# Patient Record
Sex: MALE | Race: WHITE | NOT HISPANIC OR LATINO | Employment: UNEMPLOYED | URBAN - METROPOLITAN AREA
[De-identification: names, ages, dates, MRNs, and addresses within clinical notes are randomized per-mention and may not be internally consistent; named-entity substitution may affect disease eponyms.]

---

## 2021-02-10 ENCOUNTER — TELEPHONE (OUTPATIENT)
Dept: GASTROENTEROLOGY | Facility: CLINIC | Age: 41
End: 2021-02-10

## 2021-02-10 ENCOUNTER — OFFICE VISIT (OUTPATIENT)
Dept: GASTROENTEROLOGY | Facility: CLINIC | Age: 41
End: 2021-02-10

## 2021-02-10 VITALS — WEIGHT: 287.8 LBS | BODY MASS INDEX: 35.05 KG/M2 | TEMPERATURE: 97 F | HEIGHT: 76 IN

## 2021-02-10 DIAGNOSIS — R10.9 ABDOMINAL PAIN, UNSPECIFIED ABDOMINAL LOCATION: Primary | ICD-10-CM

## 2021-02-10 PROCEDURE — 99204 OFFICE O/P NEW MOD 45 MIN: CPT | Performed by: INTERNAL MEDICINE

## 2021-02-10 RX ORDER — IBUPROFEN 600 MG/1
600 TABLET ORAL
COMMUNITY
Start: 2021-01-26 | End: 2021-02-25

## 2021-02-10 NOTE — PROGRESS NOTES
Chief Complaint   Patient presents with   • Abdominal Pain   • Dizziness   • Cold Feet     Subjective   HPI  Norberto Jarrell is a 41 y.o. male who presents today for new patient evaluation.    Patient has been having issues with flank pain over the last 2 to 3 weeks.  Predominantly in the right flank but sometimes occurring bilaterally.  Because of this he underwent a CT scan of the abdomen and pelvis without contrast to rule out kidney stones.  There was incidental note of a slightly enlarged lymph node in the area of the gastrohepatic ligament measuring approximately 1.4 cm.  There is no obvious gastric abnormality although stomach was noted to be nondistended.  Patient was referred to GI for possible endoscopic evaluation.  He has no raheel abdominal pain.  Denies any heartburn or dyspeptic symptoms.  No change in bowel habit.    IMPRESSION:  1. No nephroureterolithiasis or hydronephrosis.  2. Bladder wall thickening may be exaggerated by under distention. Outlet obstruction or cystitis is not excluded.  3. Gastrohepatic ligament lymphadenopathy. Stomach is not well-distended. Endoscopy could be performed to further evaluate.    Objective   Vitals:    02/10/21 0938   Temp: 97 °F (36.1 °C)     Physical Exam  Vitals signs reviewed.   Constitutional:       Appearance: He is well-developed.   HENT:      Head: Normocephalic and atraumatic.   Skin:     General: Skin is warm and dry.   Neurological:      Mental Status: He is alert and oriented to person, place, and time.   Psychiatric:         Behavior: Behavior normal.         Thought Content: Thought content normal.         Judgment: Judgment normal.       The following data was reviewed by: Diego Weston MD on 02/10/2021:  Common labs    Common Labsle 4/30/20 4/30/20 4/30/20 4/30/20 1/25/21 1/25/21 2/9/21 2/9/21    1155 1155 1155 1155 1440 1440 1358 1358   Glucose   102 (A)  91  88    BUN   14  19  11    Creatinine   0.9  0.8  0.7    Sodium   138  136 (A)  139     Potassium   4.5  4.5  4.1    Chloride   101  102  102    Calcium   9.3  9.4  8.8    Albumin   4.2  4.4  4.2    Total Bilirubin   0.6  0.4  0.4    Alkaline Phosphatase   63  77  64    AST (SGOT)   33  34  29    ALT (SGPT)   36  43  30    WBC    7.28  7.45  6.89   Hemoglobin    15.2  14.6  14.4   Hematocrit    45.7  44.2  44.4   Platelets    307  308  309   Total Cholesterol  161         Triglycerides  68         HDL Cholesterol  48         LDL Cholesterol   99         PSA 1.72          (A) Abnormal value            Data reviewed: Radiologic studies CT scan from [facility]   Assessment/Plan   Assessment:     1.  Flank pain  2.  Gastrohepatic lymphadenopathy    Plan:   Slightly enlarged GH LN entirely non-specific, and very unlikely to be related to his primary complaint of flank pain.  Will arrange for EGD to r/o presence of any gastric inflammation/PUD, and then may consider f/u CT scan with IV and oral contrast.  He should also f/u with PCP to continue evaluate for his flank pain which does not appear to be GI related.             Diego Weston M.D.  [facility]  [address] [place] [address]  Office: [phone]

## 2021-02-12 ENCOUNTER — TELEPHONE (OUTPATIENT)
Dept: GASTROENTEROLOGY | Facility: CLINIC | Age: 41
End: 2021-02-12

## 2021-02-12 NOTE — TELEPHONE ENCOUNTER
Called pt we have time available on march 3 in the afternoon if he would like to move up lm on  pt to call

## 2021-02-12 NOTE — TELEPHONE ENCOUNTER
----- Message from Nesha Dupree sent at 2/12/2021 11:12 AM EST -----  Contact: [phone]  Patient is schedule for the week of 3/22 for a scope.  He is having increasing pain.  Can we get him in sooner for his scopes?

## 2021-02-12 NOTE — TELEPHONE ENCOUNTER
----- Message from Soy Luque sent at 2/12/2021  1:19 PM EST -----  Regarding: pt returning call  Contact: [phone]  Pt states 3/5/21 is okay to move procedure to. Please call pt to confirm. Thank you

## 2021-02-23 ENCOUNTER — TRANSCRIBE ORDERS (OUTPATIENT)
Dept: SLEEP MEDICINE | Facility: HOSPITAL | Age: 41
End: 2021-02-23

## 2021-02-23 DIAGNOSIS — Z01.818 OTHER SPECIFIED PRE-OPERATIVE EXAMINATION: Primary | ICD-10-CM

## 2021-03-01 ENCOUNTER — LAB (OUTPATIENT)
Dept: LAB | Facility: HOSPITAL | Age: 41
End: 2021-03-01

## 2021-03-01 DIAGNOSIS — Z01.818 OTHER SPECIFIED PRE-OPERATIVE EXAMINATION: ICD-10-CM

## 2021-03-01 PROCEDURE — C9803 HOPD COVID-19 SPEC COLLECT: HCPCS

## 2021-03-01 PROCEDURE — U0004 COV-19 TEST NON-CDC HGH THRU: HCPCS

## 2021-03-02 LAB — SARS-COV-2 RNA RESP QL NAA+PROBE: NOT DETECTED

## 2021-03-03 ENCOUNTER — HOSPITAL ENCOUNTER (OUTPATIENT)
Facility: HOSPITAL | Age: 41
Setting detail: HOSPITAL OUTPATIENT SURGERY
Discharge: HOME | End: 2021-03-03
Attending: INTERNAL MEDICINE | Admitting: INTERNAL MEDICINE

## 2021-03-03 ENCOUNTER — ANESTHESIA (OUTPATIENT)
Dept: GASTROENTEROLOGY | Facility: HOSPITAL | Age: 41
End: 2021-03-03

## 2021-03-03 ENCOUNTER — ANESTHESIA EVENT (OUTPATIENT)
Dept: GASTROENTEROLOGY | Facility: HOSPITAL | Age: 41
End: 2021-03-03

## 2021-03-03 VITALS
HEIGHT: 76 IN | HEART RATE: 84 BPM | OXYGEN SATURATION: 96 % | RESPIRATION RATE: 16 BRPM | WEIGHT: 287 LBS | TEMPERATURE: 98.8 F | BODY MASS INDEX: 34.95 KG/M2 | SYSTOLIC BLOOD PRESSURE: 106 MMHG | DIASTOLIC BLOOD PRESSURE: 77 MMHG

## 2021-03-03 DIAGNOSIS — R10.9 ABDOMINAL PAIN, UNSPECIFIED ABDOMINAL LOCATION: ICD-10-CM

## 2021-03-03 PROCEDURE — 25010000002 PROPOFOL 10 MG/ML EMULSION: Performed by: NURSE ANESTHETIST, CERTIFIED REGISTERED

## 2021-03-03 PROCEDURE — 43239 EGD BIOPSY SINGLE/MULTIPLE: CPT | Performed by: INTERNAL MEDICINE

## 2021-03-03 PROCEDURE — 88305 TISSUE EXAM BY PATHOLOGIST: CPT | Performed by: INTERNAL MEDICINE

## 2021-03-03 PROCEDURE — 88342 IMHCHEM/IMCYTCHM 1ST ANTB: CPT | Performed by: INTERNAL MEDICINE

## 2021-03-03 RX ORDER — EPHEDRINE SULFATE 50 MG/ML
5 INJECTION, SOLUTION INTRAVENOUS ONCE AS NEEDED
Status: DISCONTINUED | OUTPATIENT
Start: 2021-03-03 | End: 2021-03-03 | Stop reason: HOSPADM

## 2021-03-03 RX ORDER — PANTOPRAZOLE SODIUM 40 MG/1
40 TABLET, DELAYED RELEASE ORAL DAILY
Qty: 30 TABLET | Refills: 5 | Status: SHIPPED | OUTPATIENT
Start: 2021-03-03 | End: 2021-07-01

## 2021-03-03 RX ORDER — LABETALOL HYDROCHLORIDE 5 MG/ML
5 INJECTION, SOLUTION INTRAVENOUS
Status: DISCONTINUED | OUTPATIENT
Start: 2021-03-03 | End: 2021-03-03 | Stop reason: HOSPADM

## 2021-03-03 RX ORDER — PROPOFOL 10 MG/ML
VIAL (ML) INTRAVENOUS AS NEEDED
Status: DISCONTINUED | OUTPATIENT
Start: 2021-03-03 | End: 2021-03-03 | Stop reason: SURG

## 2021-03-03 RX ORDER — FENTANYL CITRATE 50 UG/ML
25 INJECTION, SOLUTION INTRAMUSCULAR; INTRAVENOUS
Status: DISCONTINUED | OUTPATIENT
Start: 2021-03-03 | End: 2021-03-03 | Stop reason: HOSPADM

## 2021-03-03 RX ORDER — NALOXONE HCL 0.4 MG/ML
0.4 VIAL (ML) INJECTION AS NEEDED
Status: DISCONTINUED | OUTPATIENT
Start: 2021-03-03 | End: 2021-03-03 | Stop reason: HOSPADM

## 2021-03-03 RX ORDER — HYDRALAZINE HYDROCHLORIDE 20 MG/ML
10 INJECTION INTRAMUSCULAR; INTRAVENOUS
Status: DISCONTINUED | OUTPATIENT
Start: 2021-03-03 | End: 2021-03-03 | Stop reason: HOSPADM

## 2021-03-03 RX ORDER — SODIUM CHLORIDE, SODIUM LACTATE, POTASSIUM CHLORIDE, CALCIUM CHLORIDE 600; 310; 30; 20 MG/100ML; MG/100ML; MG/100ML; MG/100ML
30 INJECTION, SOLUTION INTRAVENOUS CONTINUOUS PRN
Status: DISCONTINUED | OUTPATIENT
Start: 2021-03-03 | End: 2021-03-03 | Stop reason: HOSPADM

## 2021-03-03 RX ORDER — GLYCOPYRROLATE 0.2 MG/ML
INJECTION INTRAMUSCULAR; INTRAVENOUS AS NEEDED
Status: DISCONTINUED | OUTPATIENT
Start: 2021-03-03 | End: 2021-03-03 | Stop reason: SURG

## 2021-03-03 RX ORDER — PROPOFOL 10 MG/ML
VIAL (ML) INTRAVENOUS CONTINUOUS PRN
Status: DISCONTINUED | OUTPATIENT
Start: 2021-03-03 | End: 2021-03-03 | Stop reason: SURG

## 2021-03-03 RX ORDER — DIPHENHYDRAMINE HYDROCHLORIDE 50 MG/ML
6.25 INJECTION INTRAMUSCULAR; INTRAVENOUS
Status: DISCONTINUED | OUTPATIENT
Start: 2021-03-03 | End: 2021-03-03 | Stop reason: HOSPADM

## 2021-03-03 RX ORDER — LIDOCAINE HYDROCHLORIDE 20 MG/ML
INJECTION, SOLUTION INFILTRATION; PERINEURAL AS NEEDED
Status: DISCONTINUED | OUTPATIENT
Start: 2021-03-03 | End: 2021-03-03 | Stop reason: SURG

## 2021-03-03 RX ORDER — ONDANSETRON 2 MG/ML
4 INJECTION INTRAMUSCULAR; INTRAVENOUS ONCE AS NEEDED
Status: DISCONTINUED | OUTPATIENT
Start: 2021-03-03 | End: 2021-03-03 | Stop reason: HOSPADM

## 2021-03-03 RX ADMIN — PROPOFOL 180 MCG/KG/MIN: 10 INJECTION, EMULSION INTRAVENOUS at 14:52

## 2021-03-03 RX ADMIN — LIDOCAINE HYDROCHLORIDE 60 MG: 20 INJECTION, SOLUTION INFILTRATION; PERINEURAL at 14:52

## 2021-03-03 RX ADMIN — PROPOFOL 50 MG: 10 INJECTION, EMULSION INTRAVENOUS at 14:52

## 2021-03-03 RX ADMIN — SODIUM CHLORIDE, POTASSIUM CHLORIDE, SODIUM LACTATE AND CALCIUM CHLORIDE 30 ML/HR: 600; 310; 30; 20 INJECTION, SOLUTION INTRAVENOUS at 14:45

## 2021-03-03 RX ADMIN — GLYCOPYRROLATE 0.2 MG: 0.2 INJECTION INTRAMUSCULAR; INTRAVENOUS at 14:50

## 2021-03-03 NOTE — ANESTHESIA PREPROCEDURE EVALUATION
Anesthesia Evaluation     no history of anesthetic complications:  NPO Solid Status: > 8 hours  NPO Liquid Status: > 2 hours           Airway   Mallampati: I  Neck ROM: full  no difficulty expected  Dental - normal exam     Pulmonary - normal exam   (+) a smoker (quit 2006) Former,   (-) COPD, asthma, sleep apnea    PE comment: nonlabored  Cardiovascular - negative cardio ROS and normal exam    Rhythm: regular  Rate: normal    (-) hypertension, valvular problems/murmurs, past MI, CAD, dysrhythmias, angina      Neuro/Psych- negative ROS  (-) seizures, TIA, CVA  GI/Hepatic/Renal/Endo - negative ROS   (-) GERD, liver disease, no renal disease, diabetes, no thyroid disorder    ROS Comment: Abd Pain    Musculoskeletal (-) negative ROS    Abdominal    Substance History      OB/GYN          Other                      Anesthesia Plan    ASA 1     MAC       Anesthetic plan, all risks, benefits, and alternatives have been provided, discussed and informed consent has been obtained with: patient.

## 2021-03-03 NOTE — ANESTHESIA POSTPROCEDURE EVALUATION
"Patient: Norberto Jarrell    Procedure Summary     Date: 03/03/21 Room / Location:  [facility]    Anesthesia Start: 1449 Anesthesia Stop: 1515    Procedure: ESOPHAGOGASTRODUODENOSCOPY WITH COLD BX'S (N/A Esophagus) Diagnosis:       Abdominal pain, unspecified abdominal location      (Abdominal pain, unspecified abdominal location [R10.9])    Surgeon: Diego Weston MD Provider: Humberto Blum MD    Anesthesia Type: MAC ASA Status: 1          Anesthesia Type: MAC    Vitals  No vitals data found for the desired time range.          Post Anesthesia Care and Evaluation    Patient location during evaluation: bedside  Patient participation: complete - patient participated  Level of consciousness: awake  Pain management: adequate  Airway patency: patent  Anesthetic complications: No anesthetic complications    Cardiovascular status: acceptable  Respiratory status: acceptable  Hydration status: acceptable    Comments: */98 (BP Location: Left arm, Patient Position: Lying)   Pulse 95   Temp 37.1 °C (98.8 °F) (Oral)   Resp 16   Ht 193 cm (76\")   Wt 130 kg (287 lb)   SpO2 95%   BMI 34.93 kg/m²         "

## 2021-03-04 ENCOUNTER — OFFICE VISIT (OUTPATIENT)
Dept: FAMILY MEDICINE CLINIC | Facility: CLINIC | Age: 41
End: 2021-03-04

## 2021-03-04 VITALS
HEART RATE: 77 BPM | SYSTOLIC BLOOD PRESSURE: 140 MMHG | OXYGEN SATURATION: 98 % | WEIGHT: 286 LBS | DIASTOLIC BLOOD PRESSURE: 86 MMHG | HEIGHT: 76 IN | RESPIRATION RATE: 16 BRPM | TEMPERATURE: 98 F | BODY MASS INDEX: 34.83 KG/M2

## 2021-03-04 DIAGNOSIS — R53.82 CHRONIC FATIGUE: Primary | ICD-10-CM

## 2021-03-04 DIAGNOSIS — R42 DIZZINESS: ICD-10-CM

## 2021-03-04 PROCEDURE — 99203 OFFICE O/P NEW LOW 30 MIN: CPT | Performed by: FAMILY MEDICINE

## 2021-03-04 RX ORDER — IBUPROFEN 600 MG/1
600 TABLET ORAL
COMMUNITY
Start: 2021-02-22 | End: 2021-03-24

## 2021-03-04 NOTE — PROGRESS NOTES
"Chief Complaint  Establish Care    Subjective    History of Present Illness {CC  Problem List  Visit  Diagnosis   Encounters  Notes  Medications  Labs  Result Review Imaging  Media :23}     Norberto Jarrell presents to [facility] for Establish Care.  History of Present Illness     Here today to establish care and with a few concerns. Didn't feel that previous PCP was taking his concerns seriously.     Has had some recent flank pain that started on the R side and would radiate towards his stomach and across his low back.  It did not seem to be caused by any inciting event, and there were no easily identified aggravating or alleviating factors.  It has since resolved completely. Had a lab workup that was overall negative, and a CT scan that showed some abdominal lymphadenopathy.  He has since had a follow-up EGD yesterday, results pending.  Has some continued fatigue and dizziness as well as some cold hands and feet.  Has a history of liver cancer in the family, seems concerned about his own risk.  Had a CMP recently that was negative.  CT scan showed no intrahepatic issues.    Not getting any sort of regular exercise, weight is up somewhat.  Stays at home caring for his 3-year-old.  Mood has been a little down of late.  Admits that he does not have much of a self-care regimen at present.    Objective     Vital Signs:   /86   Pulse 77   Temp 98 °F (36.7 °C)   Resp 16   Ht 193 cm (76\")   Wt 130 kg (286 lb)   SpO2 98%   BMI 34.81 kg/m²   Physical Exam  Vitals signs and nursing note reviewed.   Constitutional:       General: He is not in acute distress.     Appearance: Normal appearance. He is well-developed. He is not ill-appearing.   HENT:      Right Ear: Tympanic membrane, ear canal and external ear normal.      Left Ear: Tympanic membrane, ear canal and external ear normal.      Nose: Nose normal.      Mouth/Throat:      Mouth: Mucous membranes are moist.      " Pharynx: Oropharynx is clear.   Eyes:      Extraocular Movements: Extraocular movements intact.      Conjunctiva/sclera: Conjunctivae normal.      Pupils: Pupils are equal, round, and reactive to light.   Neck:      Musculoskeletal: Normal range of motion and neck supple.   Cardiovascular:      Rate and Rhythm: Normal rate and regular rhythm.      Pulses: Normal pulses.      Heart sounds: Normal heart sounds. No murmur.   Pulmonary:      Effort: Pulmonary effort is normal. No respiratory distress.      Breath sounds: Normal breath sounds. No wheezing.   Abdominal:      General: Abdomen is flat. Bowel sounds are normal. There is no distension.      Palpations: Abdomen is soft.      Tenderness: There is no abdominal tenderness. There is no guarding or rebound.   Musculoskeletal: Normal range of motion.         General: No tenderness.   Skin:     General: Skin is warm and dry.   Neurological:      General: No focal deficit present.      Mental Status: He is alert and oriented to person, place, and time.      Sensory: No sensory deficit.   Psychiatric:         Mood and Affect: Mood normal.         Behavior: Behavior normal.          Result Review  Data Reviewed:{ Labs  Result Review  Imaging  Med Tab  Media :23}                   Assessment and Plan {CC Problem List  Visit Diagnosis  ROS  Review (Popup)  [facility] Maintenance  Quality  BestPractice  Medications  SmartSets  SnapShot Encounters  Media :23}   Diagnoses and all orders for this visit:    1. Chronic fatigue (Primary)    2. Dizziness    Unclear etiology at this time.  We discussed other evaluation that could be done.  I do not think that any screening for cancer is indicated at this time.  We will wait to see what the results of the EGD are.  I did recommend that he work on some self-care to include diet and exercise changes.  I hope that he starts to feel better in the near future.    Plan for follow-up as below so that we can check for interim  changes.  Encouraged him to keep in touch via [employer] the meantime.      Follow Up {Instructions Charge Capture  Follow-up Communications :23}     Patient was given instructions and counseling regarding his condition or for health maintenance advice. Please see specific information pulled into the AVS (placed there by myself) if appropriate.    Return in about 1 month (around 4/4/2021), or if symptoms worsen or fail to improve.      SHANDRA Jaramillo MD

## 2021-03-08 LAB
CYTO UR: NORMAL
LAB AP CASE REPORT: NORMAL
PATH REPORT.FINAL DX SPEC: NORMAL
PATH REPORT.GROSS SPEC: NORMAL

## 2021-03-09 ENCOUNTER — TELEPHONE (OUTPATIENT)
Dept: GASTROENTEROLOGY | Facility: CLINIC | Age: 41
End: 2021-03-09

## 2021-03-09 DIAGNOSIS — A04.8 BACTERIAL INFECTION DUE TO H. PYLORI: Primary | ICD-10-CM

## 2021-03-09 NOTE — TELEPHONE ENCOUNTER
----- Message from Diego Weston MD sent at 3/8/2021  4:59 PM EST -----  H pylori gastritis    Prevpac with f/u HPBT in 4-6 weeks

## 2021-03-09 NOTE — TELEPHONE ENCOUNTER
Patient called advised as per Dr. Weston's note. He verb understanding and is in agreement with the plan.   HPBT scheduled for 5/3@1300. Patietn advised to hold his Pantoprazole starting 4/19 and to restart the medicaiton after he completes his breath test.   Medication called into patient's pharmacy.

## 2021-05-03 ENCOUNTER — LAB (OUTPATIENT)
Dept: GASTROENTEROLOGY | Facility: CLINIC | Age: 41
End: 2021-05-03

## 2021-05-04 LAB — UREA BREATH TEST QL: NEGATIVE

## 2021-06-10 ENCOUNTER — TELEPHONE (OUTPATIENT)
Dept: GASTROENTEROLOGY | Facility: CLINIC | Age: 41
End: 2021-06-10

## 2021-06-10 NOTE — TELEPHONE ENCOUNTER
----- Message from Diego Weston MD sent at 5/18/2021 12:17 PM EDT -----  Negative    Office f/u in 4-6 weeks

## 2021-07-01 ENCOUNTER — OFFICE VISIT (OUTPATIENT)
Dept: FAMILY MEDICINE CLINIC | Facility: CLINIC | Age: 41
End: 2021-07-01

## 2021-07-01 VITALS
HEART RATE: 71 BPM | RESPIRATION RATE: 16 BRPM | WEIGHT: 289 LBS | HEIGHT: 76 IN | BODY MASS INDEX: 35.19 KG/M2 | OXYGEN SATURATION: 98 % | DIASTOLIC BLOOD PRESSURE: 90 MMHG | SYSTOLIC BLOOD PRESSURE: 118 MMHG

## 2021-07-01 DIAGNOSIS — F32.A ANXIETY AND DEPRESSION: Primary | ICD-10-CM

## 2021-07-01 DIAGNOSIS — F41.9 ANXIETY AND DEPRESSION: Primary | ICD-10-CM

## 2021-07-01 DIAGNOSIS — R06.83 LOUD SNORING: ICD-10-CM

## 2021-07-01 PROBLEM — R10.9 ABDOMINAL PAIN: Status: RESOLVED | Noted: 2021-02-10 | Resolved: 2021-07-01

## 2021-07-01 PROCEDURE — 99214 OFFICE O/P EST MOD 30 MIN: CPT | Performed by: FAMILY MEDICINE

## 2021-07-01 NOTE — PROGRESS NOTES
"Chief Complaint  Abdominal Pain    Subjective    History of Present Illness {  Problem List  Visit  Diagnosis   Encounters  Notes  Medications  Labs  Result Review Imaging  Media :23}     Norberto Jarrell presents to [facility] for Abdominal Pain.  History of Present Illness     Here for f/u and with a few concerns. Abdominal pain has more or less subsided. Was tested and treated for H. Pylori with resolution of his symptoms.    Now concerned by increased need for sleep, heavy snoring, concern for possible sleep apnea. He and his wife no longer sleep in the same room due to the volume of snoring. No known apneic spells, but notices that his sleep requirements have increased over the past year or so. Also has difficulty with sleep onset and staying asleep. Has never had a sleep study in the past.     Also worried about increased anxiety. Feels that he's always been on the anxious side, but symptoms seem much worse of late. Anxiety has been leading to some avoidant behaviors.  Both PHQ-9 and ALESSANDRO-7 indicate mild disease.  Has never been on an SSRI in the past but is interested in trying one.    Objective     Vital Signs:   /90   Pulse 71   Resp 16   Ht 193 cm (75.98\")   Wt 131 kg (289 lb)   SpO2 98%   BMI 35.19 kg/m²   Physical Exam  Vitals and nursing note reviewed.   Constitutional:       General: He is not in acute distress.     Appearance: Normal appearance. He is not ill-appearing.   Cardiovascular:      Rate and Rhythm: Normal rate and regular rhythm.      Pulses: Normal pulses.      Heart sounds: Normal heart sounds. No murmur heard.     Pulmonary:      Effort: Pulmonary effort is normal. No respiratory distress.      Breath sounds: Normal breath sounds. No rales.   Neurological:      Mental Status: He is alert and oriented to person, place, and time. Mental status is at baseline.   Psychiatric:         Mood and Affect: Mood normal.         Behavior: Behavior " normal.          Result Review  Data Reviewed:{ Labs  Result Review  Imaging  Med Tab  Media :23}                   Assessment and Plan {CC Problem List  Visit Diagnosis  ROS  Review (Popup)  Health Maintenance  Quality  BestPractice  Medications  SmartSets  SnapShot Encounters  Media :23}   Diagnoses and all orders for this visit:    1. Anxiety and depression (Primary)  -     sertraline (ZOLOFT) 50 MG tablet; Take 1 tablet by mouth Daily for 180 days.  Dispense: 90 tablet; Refill: 0    2. Loud snoring  -     Ambulatory Referral to Sleep Medicine    Long discussion about management of anxiety depression.  Decided to start sertraline as above.  Discussed home titration.    Will refer to sleep medicine for sleep study as requested.    Recommended follow-up as below.  Encouraged communication via [employer] in the meantime.      Follow Up {Instructions Charge Capture  Follow-up Communications :23}     Patient was given instructions and counseling regarding his condition or for health maintenance advice. Please see specific information pulled into the AVS (placed there by myself) if appropriate.    No follow-ups on file.      SHANDRA Jaramillo MD

## 2021-07-07 ENCOUNTER — OFFICE VISIT (OUTPATIENT)
Dept: SLEEP MEDICINE | Facility: HOSPITAL | Age: 41
End: 2021-07-07

## 2021-07-07 VITALS
HEIGHT: 76 IN | SYSTOLIC BLOOD PRESSURE: 132 MMHG | DIASTOLIC BLOOD PRESSURE: 85 MMHG | HEART RATE: 67 BPM | BODY MASS INDEX: 35.68 KG/M2 | OXYGEN SATURATION: 97 % | WEIGHT: 293 LBS

## 2021-07-07 DIAGNOSIS — E66.9 OBESITY (BMI 30-39.9): ICD-10-CM

## 2021-07-07 DIAGNOSIS — G47.10 HYPERSOMNIA: Primary | ICD-10-CM

## 2021-07-07 DIAGNOSIS — G47.8 NON-RESTORATIVE SLEEP: ICD-10-CM

## 2021-07-07 PROCEDURE — 99203 OFFICE O/P NEW LOW 30 MIN: CPT | Performed by: FAMILY MEDICINE

## 2021-07-07 PROCEDURE — G0463 HOSPITAL OUTPT CLINIC VISIT: HCPCS

## 2021-07-07 NOTE — PROGRESS NOTES
[facility] New Patient/Consultation       Reason for Consultation: loud snoring      Patient Care Team:  Dalton Jaramillo MD as PCP - General (Family Medicine)  Marcus Edmond MD as Consulting Physician (Sleep Medicine)      History of present illness:  Thank you for asking me to see your patient.  The patient is a 41 y.o. male with anxiety and depression presents today with concern for sleep disorder due to snoring hypersomnia nonrestorative sleep.  Also reports his nocturia up to 3 times a night, sleep-related bruxism.  Has gained 20 pounds in the past 5 years.  No family history of sleep apnea that he is aware of however does report family history of narcolepsy.  Patient is obese with BMI 35.7.    Sleep Schedule:  Bed time: 1 AM to 2 AM  Sleep latency: 30 to 45 minutes  Wake time: 10 AM to 11 AM  Average hours slept: 8  Non-restorative sleep: Yes  Number of naps per day: Sometimes  Rotating shifts?:  No  Nocturia: Up to 3 times a night  Electronics in bedroom: Yes    Excessive daytime sleepiness or drowsiness:Y  Any accidents at work due to sleepiness in the last 5 years:N  Any difficulty driving due to sleepiness or being drowsy: N  Weight changed in the last 5 years:Y - GAINED 20 LBS    Snoring:Y  Witnessed apneas:N  Have you ever awakened gasping for breath, coughing, choking or respiratory discomfort: N  Morning headaches: N  Awaken with a sore throat or dry mouth: N    Any reports of leg jerking at night: N  Urge sensations: N  Does pain disrupt sleep: N  Sweating during sleep: N  Teeth grinding: Y    Any sudden episodes of sleep during the day: N  Sleep paralysis/hallucinations: N  Muscle weakness with laughing/anger: N  Nightmares: N  Sleep walking: N    Are you sleepy when you increase your sleep time: N  Do you sleep better away from your own bed: N    ESS: 0    Social History: Smokes cigarettes from ages [age] 2 alcoholic beverages a day 2 coffees a day    Review of Systems:    A  "complete review of systems was done and all were negative with the exception of anxiety depression    Allergies:  Patient has no known allergies.    Family History: MIGUEL no       Current Outpatient Medications:   •  Multiple Vitamins-Minerals (ZINC PO), Take  by mouth Daily., Disp: , Rfl:   •  sertraline (ZOLOFT) 50 MG tablet, Take 1 tablet by mouth Daily for 180 days., Disp: 90 tablet, Rfl: 0  •  VITAMIN D PO, Take  by mouth Daily., Disp: , Rfl:     Vital Signs:    Vitals:    07/07/21 1033   BP: 132/85   Pulse: 67   SpO2: 97%   Weight: 133 kg (293 lb)   Height: 193 cm (76\")      Body mass index is 35.67 kg/m².  Neck Circumference: 16.5 inches      Physical Exam:   General Alert and oriented. No acute distress noted   Pharynx/Throat Class II/III Mallampati airway, large tongue, no evidence of redundant lateral pharyngeal tissue. No oral lesions. No thrush. Moist mucous membranes.   Head Normocephalic. Symmetrical. Atraumatic.    Nose No septal deviation. No drainage   Chest Wall Normal shape. Symmetric expansion with respiration. No tenderness.   Neck Trachea midline, no thyromegaly or adenopathy    Lungs Clear to auscultation bilaterally. No wheezes. No rhonchi. No rales. Respirations regular, even and unlabored.   Heart Regular rhythm and normal rate. Normal S1 and S2. No murmur   Abdomen Soft, non-tender and non-distended. Normal bowel sounds. No masses.   Extremities Moves all extremities well. No edema   Psychiatric Normal mood and affect.       Impression:  1. Hypersomnia    2. Non-restorative sleep    3. Obesity (BMI 30-39.9)        Plan:    Good sleep hygiene measures should be maintained.  Weight loss would be beneficial in this patient who is obese with BMI 35.7.    I discussed the pathophysiology of obstructive sleep apnea with the patient.  We discussed the adverse outcomes associated with untreated sleep-disordered breathing.  We discussed treatment modalities of obstructive sleep apnea including CPAP " device as well as oral mandibular advancement device. Sleep study will be scheduled to establish definitive diagnosis of sleep disorder breathing.  Weight loss will be strongly beneficial in order to reduce the severity of sleep-disordered breathing.  Patient has narrow oropharyngeal structure.  Caution during activities that require prolonged concentration is strongly advised.  Patient will be notified of sleep study results after sleep study is completed.  If sleep apnea is only mild,  oral mandibular advancement device may be one of the treatment options.  However if sleep apnea is moderately severe, CPAP treatment will be strongly encouraged.  The patient is not opposed to treatment with CPAP device if we confirm significant obstructive sleep apnea on polysomnography.     If HST negative can discuss more about family history of narcolepsy consider MSLT.    Thank you for allowing me to participate in your patient's care.    Marcus Edmond MD  Sleep Medicine  07/07/21  10:54 EDT

## 2021-07-22 ENCOUNTER — HOSPITAL ENCOUNTER (OUTPATIENT)
Dept: SLEEP MEDICINE | Facility: HOSPITAL | Age: 41
Discharge: HOME | End: 2021-07-22
Admitting: FAMILY MEDICINE

## 2021-07-22 DIAGNOSIS — E66.9 OBESITY (BMI 30-39.9): ICD-10-CM

## 2021-07-22 DIAGNOSIS — G47.10 HYPERSOMNIA: ICD-10-CM

## 2021-07-22 DIAGNOSIS — G47.8 NON-RESTORATIVE SLEEP: ICD-10-CM

## 2021-07-22 PROCEDURE — 95806 SLEEP STUDY UNATT&RESP EFFT: CPT | Performed by: FAMILY MEDICINE

## 2021-07-22 PROCEDURE — 95806 SLEEP STUDY UNATT&RESP EFFT: CPT

## 2021-08-11 ENCOUNTER — OFFICE VISIT (OUTPATIENT)
Dept: SLEEP MEDICINE | Facility: HOSPITAL | Age: 41
End: 2021-08-11

## 2021-08-11 VITALS
WEIGHT: 297 LBS | DIASTOLIC BLOOD PRESSURE: 88 MMHG | HEART RATE: 80 BPM | SYSTOLIC BLOOD PRESSURE: 142 MMHG | BODY MASS INDEX: 36.17 KG/M2 | HEIGHT: 76 IN | OXYGEN SATURATION: 95 %

## 2021-08-11 DIAGNOSIS — G47.33 OBSTRUCTIVE SLEEP APNEA: Primary | ICD-10-CM

## 2021-08-11 PROCEDURE — 99213 OFFICE O/P EST LOW 20 MIN: CPT | Performed by: FAMILY MEDICINE

## 2021-08-11 PROCEDURE — G0463 HOSPITAL OUTPT CLINIC VISIT: HCPCS

## 2021-08-11 NOTE — PROGRESS NOTES
"Follow Up Sleep Disorders Center Note     Chief Complaint:  MIGUEL     Primary Care Physician: Dalton Jaramillo MD    Norberto Jarrell is a 41 y.o.male  was last seen at [facility] sleep lab: 7/22/2021 for home sleep study which showed overall AHI 17.1 events per hour.  Lowest oxygen saturation of 87%.  Snoring 44.7% of the time.  Presents today to discuss results and treatment options.    Current Medications:    Current Outpatient Medications:   •  Multiple Vitamins-Minerals (ZINC PO), Take  by mouth Daily., Disp: , Rfl:   •  sertraline (ZOLOFT) 50 MG tablet, Take 1 tablet by mouth Daily for 180 days., Disp: 90 tablet, Rfl: 0  •  VITAMIN D PO, Take  by mouth Daily., Disp: , Rfl:    also entered in Sleep Questionnaire    Patient  has a past medical history of Abdominal pain (2/10/2021).    Social History:    Social History     Socioeconomic History   • Marital status:      Spouse name: Not on file   • Number of children: Not on file   • Years of education: Not on file   • Highest education level: Not on file   Tobacco Use   • Smoking status: Former Smoker     Packs/day: 1.00     Years: 7.00     Pack years: 7.00     Quit date: 2006     Years since quitting: 15.6   • Smokeless tobacco: Former User   Substance and Sexual Activity   • Alcohol use: Yes     Alcohol/week: 14.0 standard drinks     Types: 14 Cans of beer per week   • Drug use: Never   • Sexual activity: Yes     Partners: Female     Comment: , one kid       Allergies:  Patient has no known allergies.    Review of Systems:    A complete review of systems was done and all were negative with the exception of ALL NEGATIVE.    Vital Signs:    Vitals:    08/11/21 1300   BP: 142/88   Pulse: 80   SpO2: 95%   Weight: 135 kg (297 lb)   Height: 193 cm (76\")     Body mass index is 36.15 kg/m².    Vital Signs /88   Pulse 80   Ht 193 cm (76\")   Wt 135 kg (297 lb)   SpO2 95%   BMI 36.15 kg/m²  Body mass index is 36.15 kg/m².    General Alert and oriented. " No acute distress noted   Pharynx/Throat Class II/III Mallampati airway, large tongue, no evidence of redundant lateral pharyngeal tissue. No oral lesions. No thrush. Moist mucous membranes.   Head Normocephalic. Symmetrical. Atraumatic.    Nose No septal deviation. No drainage   Chest Wall Normal shape. Symmetric expansion with respiration. No tenderness.   Neck Trachea midline, no thyromegaly or adenopathy    Lungs Clear to auscultation bilaterally. No wheezes. No rhonchi. No rales. Respirations regular, even and unlabored.   Heart Regular rhythm and normal rate. Normal S1 and S2. No murmur   Abdomen Soft, non-tender and non-distended. Normal bowel sounds. No masses.   Extremities Moves all extremities well. No edema   Psychiatric Normal mood and affect.     Impression:  1. Obstructive sleep apnea        Prefers auto CPAP over oral mandibular device.  Will start auto CPAP 6-15 cm H2O.  Return to clinic in 6 weeks for follow-up or sooner if needed.    Patient uses the CPAP device and benefits from its use in terms of reduction of hypersomnia and snoring.Weight loss will be strongly beneficial to reduce the severity of sleep-disordered breathing.  Caution during activities that require prolonged concentration is strongly advised if sleepiness returns. Changing of PAP supplies regularly is important for effective use. Patient needs to change cushion on the mask or plugs on nasal pillows along with disposable filters once every month and change mask frame, tubing, headgear and Velcro straps every 6 months at the minimum.    Time spent during visit: 20 minutes of which at least 50% of the time was spent counseling patient.    Marcus Edmond MD  Sleep Medicine  08/11/21  13:26 EDT

## 2021-08-12 ENCOUNTER — TELEPHONE (OUTPATIENT)
Dept: SLEEP MEDICINE | Facility: HOSPITAL | Age: 41
End: 2021-08-12

## 2021-08-12 NOTE — TELEPHONE ENCOUNTER
Pt was seen in office for sleep study results.  Faxed orders to [facility] on 8/12/2021.  F/u appt scheduled on 10/27/2021 @ 1:30 with Dr. Edmond.  CV

## 2021-08-17 ENCOUNTER — OFFICE VISIT (OUTPATIENT)
Dept: FAMILY MEDICINE CLINIC | Facility: CLINIC | Age: 41
End: 2021-08-17

## 2021-08-17 ENCOUNTER — TELEPHONE (OUTPATIENT)
Dept: SLEEP MEDICINE | Facility: HOSPITAL | Age: 41
End: 2021-08-17

## 2021-08-17 VITALS
HEART RATE: 110 BPM | WEIGHT: 291 LBS | RESPIRATION RATE: 16 BRPM | DIASTOLIC BLOOD PRESSURE: 86 MMHG | SYSTOLIC BLOOD PRESSURE: 120 MMHG | OXYGEN SATURATION: 99 % | BODY MASS INDEX: 35.42 KG/M2

## 2021-08-17 DIAGNOSIS — L30.1 DYSHIDROTIC ECZEMA: Primary | ICD-10-CM

## 2021-08-17 DIAGNOSIS — H69.81 EUSTACHIAN TUBE DYSFUNCTION, RIGHT: ICD-10-CM

## 2021-08-17 PROCEDURE — 99213 OFFICE O/P EST LOW 20 MIN: CPT | Performed by: FAMILY MEDICINE

## 2021-08-17 RX ORDER — BETAMETHASONE DIPROPIONATE 0.5 MG/G
CREAM TOPICAL DAILY
Qty: 50 G | Refills: 1 | Status: SHIPPED | OUTPATIENT
Start: 2021-08-17

## 2021-08-17 NOTE — PROGRESS NOTES
Chief Complaint  Earache    Presents with a history of sinus congestion starting 5 days ago which is improving, however he has lingering pressure in his right ear. He feels pressure and muffled ear sounds.     He also has eczema on hands bilaterally for which he was prescribed high potency topical steroid in the past with good results. Hoping for the same today. Typically worse with frequent hand washing. Doesn't use a regular emollient.     Subjective    History of Present Illness {CC  Problem List  Visit  Diagnosis   Encounters  Notes  Medications  Labs  Result Review Imaging  Media :23}     Norberto Jarrell presents to [facility] for Earache.  History of Present Illness         Objective     Vital Signs:   /86   Pulse 110   Resp 16   Wt 132 kg (291 lb)   SpO2 99%   BMI 35.42 kg/m²   Physical Exam  Vitals and nursing note reviewed.   Constitutional:       General: He is not in acute distress.     Appearance: Normal appearance. He is not ill-appearing.   HENT:      Right Ear: Hearing, ear canal and external ear normal. A middle ear effusion is present.      Left Ear: Hearing, tympanic membrane, ear canal and external ear normal.      Ears:      Comments: Slight serous effusion behind R TM with decreased mobility     Nose: Mucosal edema present.      Right Turbinates: Enlarged.      Left Turbinates: Enlarged.   Skin:     Comments: Erythema, small vesicles and peeling skin c/w dyshidrotic eczema   Neurological:      Mental Status: He is alert.          Result Review  Data Reviewed:{ Labs  Result Review  Imaging  Med Tab  Media :23}                   Assessment and Plan {CC Problem List  Visit Diagnosis  ROS  Review (Popup)  [facility] Maintenance  Quality  BestPractice  Medications  SmartSets  SnapShot Encounters  Media :23}   Diagnoses and all orders for this visit:    1. Dyshidrotic eczema (Primary)  -     betamethasone, augmented, (DIPROLENE) 0.05 %  cream; Apply  topically to the appropriate area as directed Daily.  Dispense: 50 g; Refill: 1    2. Eustachian tube dysfunction, right    Steroid as above. Encouraged regular emollient use.     Recommended OTC oxymetazoline nasal spray to help with ear symptoms.     Follow up as below. Encouraged to communicate via [employer] in the meantime.       Follow Up {Instructions Charge Capture  Follow-up Communications :23}     Patient was given instructions and counseling regarding his condition or for health maintenance advice. Please see specific information pulled into the AVS (placed there by myself) if appropriate.    Return in about 3 months (around 11/17/2021) for Preventive Health Maintenance.      SHANDRA Jaramillo MD

## 2021-09-21 DIAGNOSIS — F32.A ANXIETY AND DEPRESSION: ICD-10-CM

## 2021-09-21 DIAGNOSIS — F41.9 ANXIETY AND DEPRESSION: ICD-10-CM

## 2021-10-27 ENCOUNTER — APPOINTMENT (OUTPATIENT)
Dept: SLEEP MEDICINE | Facility: HOSPITAL | Age: 41
End: 2021-10-27

## 2022-08-17 ENCOUNTER — OFFICE VISIT (OUTPATIENT)
Dept: ORTHOPEDIC SURGERY | Facility: CLINIC | Age: 42
End: 2022-08-17

## 2022-08-17 VITALS — BODY MASS INDEX: 35.44 KG/M2 | WEIGHT: 291 LBS | TEMPERATURE: 97.1 F | HEIGHT: 76 IN

## 2022-08-17 DIAGNOSIS — M17.11 ARTHRITIS OF RIGHT KNEE: ICD-10-CM

## 2022-08-17 DIAGNOSIS — R52 PAIN: Primary | ICD-10-CM

## 2022-08-17 PROCEDURE — 73562 X-RAY EXAM OF KNEE 3: CPT | Performed by: ORTHOPAEDIC SURGERY

## 2022-08-17 PROCEDURE — 99203 OFFICE O/P NEW LOW 30 MIN: CPT | Performed by: ORTHOPAEDIC SURGERY

## 2022-08-17 NOTE — PROGRESS NOTES
Patient Name: Norberto Jarrell   YOB: 1980  Referring Primary Care Physician: Dalton Jaramillo MD  BMI: Body mass index is 35.42 kg/m².    Chief Complaint:    Chief Complaint   Patient presents with   • Right Knee - Follow-up        HPI:     Norberto Jarrell is a 42 y.o. male who presents today for evaluation of   Chief Complaint   Patient presents with   • Right Knee - Follow-up   .  Patient is seen today in consultation complaining of atraumatic pain in his right knee that was going on about 2 weeks ago.  Said he woke up over the weekend and had it they went is really severe he took essentially an Advil that seem to help but he somewhat helped and most was wearing a brace it is largely better at this time he denies overdoing it may pop or click s does not really lock      Subjective   Medications:   Home Medications:  Current Outpatient Medications on File Prior to Visit   Medication Sig   • betamethasone, augmented, (DIPROLENE) 0.05 % cream Apply  topically to the appropriate area as directed Daily.   • Multiple Vitamins-Minerals (ZINC PO) Take  by mouth Daily.   • VITAMIN D PO Take  by mouth Daily.   • sertraline (ZOLOFT) 50 MG tablet TAKE 1 TABLET BY MOUTH EVERY DAY     No current facility-administered medications on file prior to visit.     Current Medications:  Scheduled Meds:  Continuous Infusions:No current facility-administered medications for this visit.    PRN Meds:.    I have reviewed the patient's medical history in detail and updated the computerized patient record.  Review and summarization of old records includes:    Past Medical History:   Diagnosis Date   • Abdominal pain 2/10/2021    Added automatically from request for surgery [id number]        Past Surgical History:   Procedure Laterality Date   • ANKLE SURGERY Left    • ENDOSCOPY N/A 3/3/2021    Procedure: ESOPHAGOGASTRODUODENOSCOPY WITH COLD BX'S;  Surgeon: Diego Weston MD;  Location: [facility] ENDOSCOPY;  Service:  Gastroenterology;  Laterality: N/A;  pre: ABDOMINAL PAIN  post: GASTRITIS        Social History     Occupational History   • Not on file   Tobacco Use   • Smoking status: Former Smoker     Packs/day: 1.00     Years: 7.00     Pack years: 7.00     Quit date:      Years since quittin.6   • Smokeless tobacco: Former User   Substance and Sexual Activity   • Alcohol use: Yes     Alcohol/week: 14.0 standard drinks     Types: 14 Cans of beer per week   • Drug use: Never   • Sexual activity: Yes     Partners: Female     Comment: , one kid      Social History     Social History Narrative     at [employer], not currently working, taking care of child at home        Family History   Problem Relation Age of Onset   • Skin cancer Mother    • Hyperlipidemia Father    • No Known Problems Sister    • Heart disease Maternal Grandfather    • Liver disease Paternal Grandmother    • Lung cancer Paternal Grandfather    • Prostate cancer Neg Hx    • Colon cancer Neg Hx        ROS: 14 point review of systems was performed and all other systems were reviewed and are negative except for documented findings in HPI and today's encounter.     Allergies: No Known Allergies  Constitutional:  Denies fever, shaking or chills   Eyes:  Denies change in visual acuity   HENT:  Denies nasal congestion or sore throat   Respiratory:  Denies cough or shortness of breath   Cardiovascular:  Denies chest pain or severe LE edema   GI:  Denies abdominal pain, nausea, vomiting, bloody stools or diarrhea   Musculoskeletal:  Numbness, tingling, pain, or loss of motor function only as noted above in history of present illness.  : Denies painful urination or hematuria  Integument:  Denies rash, lesion or ulceration   Neurologic:  Denies headache or focal weakness  Endocrine:  Denies lymphadenopathy  Psych:  Denies confusion or change in mental status   Hem:  Denies active bleeding    OBJECTIVE:  Physical Exam: 42 y.o. male  Wt  "Readings from Last 3 Encounters:   08/17/22 132 kg (291 lb)   08/17/21 132 kg (291 lb)   08/11/21 135 kg (297 lb)     Ht Readings from Last 1 Encounters:   08/17/22 193 cm (76\")     Body mass index is 35.42 kg/m².  Vitals:    08/17/22 1326   Temp: 97.1 °F (36.2 °C)     Vital signs reviewed.     General Appearance:    Alert, cooperative, in no acute distress                  Eyes: conjunctiva clear  ENT: external ears and nose atraumatic  CV: no peripheral edema  Resp: normal respiratory effort  Skin: no rashes or wounds; normal turgor  Psych: mood and affect appropriate  Lymph: no nodes appreciated  Neuro: gross sensation intact  Vascular:  Palpable peripheral pulse in noted extremity  Musculoskeletal Extremities: Exam today very pleasant gentleman little bit of patellofemoral crepitation noted.  Good range of motion ligaments feel stable Andree's is negative    Radiology:   AP lateral 40 degree PA x-ray right knee done the office today for complaints of pain without comparison views available show at least moderate arthritic change best seen on the lateral        Assessment:     ICD-10-CM ICD-9-CM   1. Pain  R52 780.96   2. Arthritis of right knee  M17.11 716.96        MDM/Plan:   The diagnosis(es), natural history, pathophysiology and treatment for diagnosis(es) were discussed. Opportunity given and questions answered.  Biomechanics of pertinent body areas discussed.  When appropriate, the use of ambulatory aids discussed.    BMI:  The concept of BMI body mass index and its importance and implications discussed.    MEDICATIONS:  The risks, benefits, warnings,side effects and alternatives of medications discussed.  Inflammation/pain control; with cold, heat, elevation and/or liniments discussed as appropriate  CONSULT: This Consult is done at the request of a requesting provider to whom I will send this report with this rendered opinion.  MEDICAL RECORDS reviewed from other provider(s) for past and current " medical history pertinent to this complaint.    Seems to be doing better when her medications inflammation control brace wear etc. for bothering him a lot he gets mechanical symptoms I would recommend getting an MRI.  He seemed to be doing better than that today.  I think a lot of his problems related some the arthritis in his knees he must of aggravated somehow and hopefully we continue to do well  8/17/2022    Dictated utilizing Dragon dictation

## 2022-11-23 ENCOUNTER — APPOINTMENT (OUTPATIENT)
Dept: GENERAL RADIOLOGY | Facility: HOSPITAL | Age: 42
End: 2022-11-23

## 2022-11-23 ENCOUNTER — APPOINTMENT (OUTPATIENT)
Dept: CT IMAGING | Facility: HOSPITAL | Age: 42
End: 2022-11-23

## 2022-11-23 ENCOUNTER — HOSPITAL ENCOUNTER (EMERGENCY)
Facility: HOSPITAL | Age: 42
Discharge: HOME | End: 2022-11-23
Attending: EMERGENCY MEDICINE | Admitting: EMERGENCY MEDICINE

## 2022-11-23 VITALS
TEMPERATURE: 97.6 F | SYSTOLIC BLOOD PRESSURE: 107 MMHG | HEART RATE: 84 BPM | HEIGHT: 76 IN | BODY MASS INDEX: 36.53 KG/M2 | WEIGHT: 300 LBS | RESPIRATION RATE: 16 BRPM | DIASTOLIC BLOOD PRESSURE: 82 MMHG | OXYGEN SATURATION: 96 %

## 2022-11-23 DIAGNOSIS — R07.89 ATYPICAL CHEST PAIN: Primary | ICD-10-CM

## 2022-11-23 DIAGNOSIS — R91.8 PULMONARY NODULES: ICD-10-CM

## 2022-11-23 LAB
ALBUMIN SERPL-MCNC: 4 G/DL (ref 3.5–5.2)
ALBUMIN/GLOB SERPL: 1.3 G/DL
ALP SERPL-CCNC: 69 U/L (ref 39–117)
ALT SERPL W P-5'-P-CCNC: 51 U/L (ref 1–41)
ANION GAP SERPL CALCULATED.3IONS-SCNC: 10.9 MMOL/L (ref 5–15)
AST SERPL-CCNC: 38 U/L (ref 1–40)
BASOPHILS # BLD AUTO: 0.05 10*3/MM3 (ref 0–0.2)
BASOPHILS NFR BLD AUTO: 0.7 % (ref 0–1.5)
BILIRUB SERPL-MCNC: 0.4 MG/DL (ref 0–1.2)
BUN SERPL-MCNC: 13 MG/DL (ref 6–20)
BUN/CREAT SERPL: 16 (ref 7–25)
CALCIUM SPEC-SCNC: 9.1 MG/DL (ref 8.6–10.5)
CHLORIDE SERPL-SCNC: 103 MMOL/L (ref 98–107)
CO2 SERPL-SCNC: 24.1 MMOL/L (ref 22–29)
CREAT SERPL-MCNC: 0.81 MG/DL (ref 0.76–1.27)
D DIMER PPP FEU-MCNC: 0.5 MCGFEU/ML (ref 0–0.49)
DEPRECATED RDW RBC AUTO: 42 FL (ref 37–54)
EGFRCR SERPLBLD CKD-EPI 2021: 112.9 ML/MIN/1.73
EOSINOPHIL # BLD AUTO: 0.17 10*3/MM3 (ref 0–0.4)
EOSINOPHIL NFR BLD AUTO: 2.5 % (ref 0.3–6.2)
ERYTHROCYTE [DISTWIDTH] IN BLOOD BY AUTOMATED COUNT: 12.8 % (ref 12.3–15.4)
GLOBULIN UR ELPH-MCNC: 3.1 GM/DL
GLUCOSE SERPL-MCNC: 105 MG/DL (ref 65–99)
HCT VFR BLD AUTO: 41.4 % (ref 37.5–51)
HGB BLD-MCNC: 13.9 G/DL (ref 13–17.7)
HOLD SPECIMEN: NORMAL
IMM GRANULOCYTES # BLD AUTO: 0.02 10*3/MM3 (ref 0–0.05)
IMM GRANULOCYTES NFR BLD AUTO: 0.3 % (ref 0–0.5)
LYMPHOCYTES # BLD AUTO: 2.32 10*3/MM3 (ref 0.7–3.1)
LYMPHOCYTES NFR BLD AUTO: 34.6 % (ref 19.6–45.3)
MCH RBC QN AUTO: 30.3 PG (ref 26.6–33)
MCHC RBC AUTO-ENTMCNC: 33.6 G/DL (ref 31.5–35.7)
MCV RBC AUTO: 90.2 FL (ref 79–97)
MONOCYTES # BLD AUTO: 0.62 10*3/MM3 (ref 0.1–0.9)
MONOCYTES NFR BLD AUTO: 9.2 % (ref 5–12)
NEUTROPHILS NFR BLD AUTO: 3.53 10*3/MM3 (ref 1.7–7)
NEUTROPHILS NFR BLD AUTO: 52.7 % (ref 42.7–76)
NRBC BLD AUTO-RTO: 0 /100 WBC (ref 0–0.2)
PLATELET # BLD AUTO: 279 10*3/MM3 (ref 140–450)
PMV BLD AUTO: 9 FL (ref 6–12)
POTASSIUM SERPL-SCNC: 3.8 MMOL/L (ref 3.5–5.2)
PROT SERPL-MCNC: 7.1 G/DL (ref 6–8.5)
QT INTERVAL: 390 MS
RBC # BLD AUTO: 4.59 10*6/MM3 (ref 4.14–5.8)
SODIUM SERPL-SCNC: 138 MMOL/L (ref 136–145)
TROPONIN T SERPL-MCNC: <0.01 NG/ML (ref 0–0.03)
TROPONIN T SERPL-MCNC: <0.01 NG/ML (ref 0–0.03)
WBC NRBC COR # BLD: 6.71 10*3/MM3 (ref 3.4–10.8)
WHOLE BLOOD HOLD COAG: NORMAL
WHOLE BLOOD HOLD SPECIMEN: NORMAL

## 2022-11-23 PROCEDURE — 85379 FIBRIN DEGRADATION QUANT: CPT | Performed by: EMERGENCY MEDICINE

## 2022-11-23 PROCEDURE — 93005 ELECTROCARDIOGRAM TRACING: CPT

## 2022-11-23 PROCEDURE — 93010 ELECTROCARDIOGRAM REPORT: CPT | Performed by: INTERNAL MEDICINE

## 2022-11-23 PROCEDURE — 85025 COMPLETE CBC W/AUTO DIFF WBC: CPT | Performed by: PHYSICIAN ASSISTANT

## 2022-11-23 PROCEDURE — 84484 ASSAY OF TROPONIN QUANT: CPT | Performed by: PHYSICIAN ASSISTANT

## 2022-11-23 PROCEDURE — 71275 CT ANGIOGRAPHY CHEST: CPT

## 2022-11-23 PROCEDURE — 36415 COLL VENOUS BLD VENIPUNCTURE: CPT

## 2022-11-23 PROCEDURE — 99284 EMERGENCY DEPT VISIT MOD MDM: CPT

## 2022-11-23 PROCEDURE — 0 IOPAMIDOL PER 1 ML: Performed by: EMERGENCY MEDICINE

## 2022-11-23 PROCEDURE — 80053 COMPREHEN METABOLIC PANEL: CPT | Performed by: PHYSICIAN ASSISTANT

## 2022-11-23 PROCEDURE — 71045 X-RAY EXAM CHEST 1 VIEW: CPT

## 2022-11-23 RX ORDER — SODIUM CHLORIDE 0.9 % (FLUSH) 0.9 %
10 SYRINGE (ML) INJECTION AS NEEDED
Status: DISCONTINUED | OUTPATIENT
Start: 2022-11-23 | End: 2022-11-23 | Stop reason: HOSPADM

## 2022-11-23 RX ORDER — ASPIRIN 81 MG/1
324 TABLET, CHEWABLE ORAL ONCE
Status: DISCONTINUED | OUTPATIENT
Start: 2022-11-23 | End: 2022-11-23

## 2022-11-23 RX ORDER — ASPIRIN 81 MG/1
324 TABLET, CHEWABLE ORAL ONCE
Status: COMPLETED | OUTPATIENT
Start: 2022-11-23 | End: 2022-11-23

## 2022-11-23 RX ADMIN — IOPAMIDOL 95 ML: 755 INJECTION, SOLUTION INTRAVENOUS at 13:20

## 2022-11-23 RX ADMIN — Medication 10 ML: at 10:56

## 2022-11-23 RX ADMIN — ASPIRIN 324 MG: 81 TABLET, CHEWABLE ORAL at 11:46

## 2022-11-23 RX ADMIN — SODIUM CHLORIDE, POTASSIUM CHLORIDE, SODIUM LACTATE AND CALCIUM CHLORIDE 1000 ML: 600; 310; 30; 20 INJECTION, SOLUTION INTRAVENOUS at 12:35

## 2022-11-23 NOTE — ED NOTES
Pt woke from sleep this am with L anterior sharp stabbing CP, better now.     Denies any cardiac or lung hx in past. Denies cough, SOB.     Pt wearing face mask during their stay in ER. This RN wore appropriate ppe while providing patient care.

## 2022-11-23 NOTE — ED PROVIDER NOTES
" EMERGENCY DEPARTMENT ENCOUNTER    Room Number:    Date of encounter:  2022  PCP: Dalton Jaramillo MD  Historian: Patient, wife      HPI:  Chief Complaint: Chest pain  A complete HPI/ROS/PMH/PSH/SH/FH are unobtainable due to: None    Context: Norberto Jarrell is a 42 y.o. male who presents to the ED c/o chest pain.  Onset 4 AM.  Pain feels like a \"clot\" to left anterior chest.  This is constant.  It is 2/10 in intensity but becomes 4/10 in intensity with standing up.Also worsens with inspiration.  No recent hospitalizations, surgeries, long distance travel.  No fever, cough, shortness of breath, vomiting, diaphoresis.      PAST MEDICAL HISTORY  Active Ambulatory Problems     Diagnosis Date Noted   • Anxiety and depression 2021   • Obstructive sleep apnea 2021   • Dyshidrotic eczema 2021     Resolved Ambulatory Problems     Diagnosis Date Noted   • Abdominal pain 02/10/2021     No Additional Past Medical History         PAST SURGICAL HISTORY  Past Surgical History:   Procedure Laterality Date   • ANKLE SURGERY Left    • ENDOSCOPY N/A 3/3/2021    Procedure: ESOPHAGOGASTRODUODENOSCOPY WITH COLD BX'S;  Surgeon: Diego Weston MD;  [facility]: [facility] END[facility];  Service: Gastroenterology;  Laterality: N/A;  pre: ABDOMINAL PAIN  post: GASTRITIS         FAMILY HISTORY  Family History   Problem Relation Age of Onset   • Skin cancer Mother    • Hyperlipidemia Father    • No Known Problems Sister    • Heart disease Maternal Grandfather    • Liver disease Paternal Grandmother    • Lung cancer Paternal Grandfather    • Prostate cancer Neg Hx    • Colon cancer Neg Hx          SOCIAL HISTORY  Social History     Socioeconomic History   • Marital status:    Tobacco Use   • Smoking status: Former     Packs/day: 1.00     Years: 7.00     Pack years: 7.00     Types: Cigarettes     Quit date:      Years since quittin.9   • Smokeless tobacco: Former   Substance and Sexual Activity "   • Alcohol use: Yes     Alcohol/week: 14.0 standard drinks     Types: 14 Cans of beer per week   • Drug use: Never   • Sexual activity: Yes     Partners: Female     Comment: , one kid         ALLERGIES  Patient has no known allergies.        REVIEW OF SYSTEMS  Review of Systems     All systems reviewed and negative except for those discussed in HPI.       PHYSICAL EXAM    I have reviewed the triage vital signs and nursing notes.    ED Triage Vitals   Temp Heart Rate Resp BP SpO2   11/23/22 1000 11/23/22 1000 11/23/22 1103 11/23/22 1103 11/23/22 1000   97.6 °F (36.4 °C) 76 18 140/90 96 %      Temp src Heart Rate Source Patient Position BP Location FiO2 (%)   11/23/22 1000 -- -- -- --   Tympanic           Physical Exam  GENERAL: not distressed  HENT: nares patent  EYES: no scleral icterus  CV: regular rhythm, regular rate, 2+ radial pulses bilaterally  RESPIRATORY: normal effort, clear to auscultation bilaterally  ABDOMEN: soft, nontender  MUSCULOSKELETAL: no deformity, no reproducible chest wall tenderness, no lower extremity edema or tenderness  NEURO: alert, moves all extremities, follows commands  SKIN: warm, dry        LAB RESULTS  Recent Results (from the past 24 hour(s))   ECG 12 Lead Chest Pain    Collection Time: 11/23/22 10:43 AM   Result Value Ref Range    QT Interval 390 ms   Comprehensive Metabolic Panel    Collection Time: 11/23/22 10:57 AM    Specimen: Blood   Result Value Ref Range    Glucose 105 (H) 65 - 99 mg/dL    BUN 13 6 - 20 mg/dL    Creatinine 0.81 0.76 - 1.27 mg/dL    Sodium 138 136 - 145 mmol/L    Potassium 3.8 3.5 - 5.2 mmol/L    Chloride 103 98 - 107 mmol/L    CO2 24.1 22.0 - 29.0 mmol/L    Calcium 9.1 8.6 - 10.5 mg/dL    Total Protein 7.1 6.0 - 8.5 g/dL    Albumin 4.00 3.50 - 5.20 g/dL    ALT (SGPT) 51 (H) 1 - 41 U/L    AST (SGOT) 38 1 - 40 U/L    Alkaline Phosphatase 69 39 - 117 U/L    Total Bilirubin 0.4 0.0 - 1.2 mg/dL    Globulin 3.1 gm/dL    A/G Ratio 1.3 g/dL    BUN/Creatinine  Ratio 16.0 7.0 - 25.0    Anion Gap 10.9 5.0 - 15.0 mmol/L    eGFR 112.9 >60.0 mL/min/1.73   Troponin    Collection Time: 11/23/22 10:57 AM    Specimen: Blood   Result Value Ref Range    Troponin T <0.010 0.000 - 0.030 ng/mL   Green Top (Gel)    Collection Time: 11/23/22 10:57 AM   Result Value Ref Range    Extra Tube Hold for add-ons.    Lavender Top    Collection Time: 11/23/22 10:57 AM   Result Value Ref Range    Extra Tube hold for add-on    Light Blue Top    Collection Time: 11/23/22 10:57 AM   Result Value Ref Range    Extra Tube Hold for add-ons.    CBC Auto Differential    Collection Time: 11/23/22 10:57 AM    Specimen: Blood   Result Value Ref Range    WBC 6.71 3.40 - 10.80 10*3/mm3    RBC 4.59 4.14 - 5.80 10*6/mm3    Hemoglobin 13.9 13.0 - 17.7 g/dL    Hematocrit 41.4 37.5 - 51.0 %    MCV 90.2 79.0 - 97.0 fL    MCH 30.3 26.6 - 33.0 pg    MCHC 33.6 31.5 - 35.7 g/dL    RDW 12.8 12.3 - 15.4 %    RDW-SD 42.0 37.0 - 54.0 fl    MPV 9.0 6.0 - 12.0 fL    Platelets 279 140 - 450 10*3/mm3    Neutrophil % 52.7 42.7 - 76.0 %    Lymphocyte % 34.6 19.6 - 45.3 %    Monocyte % 9.2 5.0 - 12.0 %    Eosinophil % 2.5 0.3 - 6.2 %    Basophil % 0.7 0.0 - 1.5 %    Immature Grans % 0.3 0.0 - 0.5 %    Neutrophils, Absolute 3.53 1.70 - 7.00 10*3/mm3    Lymphocytes, Absolute 2.32 0.70 - 3.10 10*3/mm3    Monocytes, Absolute 0.62 0.10 - 0.90 10*3/mm3    Eosinophils, Absolute 0.17 0.00 - 0.40 10*3/mm3    Basophils, Absolute 0.05 0.00 - 0.20 10*3/mm3    Immature Grans, Absolute 0.02 0.00 - 0.05 10*3/mm3    nRBC 0.0 0.0 - 0.2 /100 WBC   D-dimer, Quantitative    Collection Time: 11/23/22 10:57 AM    Specimen: Blood   Result Value Ref Range    D-Dimer, Quantitative 0.50 (H) 0.00 - 0.49 MCGFEU/mL   Troponin    Collection Time: 11/23/22 12:38 PM    Specimen: Blood   Result Value Ref Range    Troponin T <0.010 0.000 - 0.030 ng/mL       Ordered the above labs and independently reviewed the results.        RADIOLOGY  XR Chest 1 View    Result  Date: 11/23/2022  XR CHEST 1 VW-  HISTORY: Male who is 42 years-old,  chest pain  TECHNIQUE: Frontal view of the chest  COMPARISON: None available  FINDINGS: Heart, mediastinum and pulmonary vasculature are unremarkable. No focal pulmonary consolidation, pleural effusion, or pneumothorax. No acute osseous process.      No evidence for acute pulmonary process. Follow-up as clinical indications persist.  This report was finalized on 11/23/2022 11:24 AM by Dr. Benjamín Savage M.D.      CT Angiogram Chest    Result Date: 11/23/2022  CT ANGIOGRAM CHEST-  HISTORY:  Chest pain.  TECHNIQUE: CT of the chest was performed following the administration of intravenous contrast using a PE protocol. Reformatted and 3-D images were reviewed. Radiation dose reduction techniques were utilized, including automated exposure control and exposure modulation based on body size.  COMPARISON:  Chest radiograph 11/23/2022   FINDINGS:   This is a diagnostic quality PE study. No acute pulmonary embolism is identified to the segmental pulmonary level. No pathologically enlarged thoracic lymph nodes are identified. Heart size is normal. There is no pericardial effusion. Evaluation for calcific coronary artery atherosclerosis is limited by the presence of intravenous contrast. The ascending aorta is ectatic to 3.6 cm at the level pulmonary trunk. The pulmonary trunk is mildly dilated to 3.1 cm. Pleural spaces are clear. Limited imaging through the upper abdomen demonstrates hepatic steatosis. There is mild bilateral gynecomastia, left greater than right. There is multilevel degenerative disc disease. Within the subcutaneous lower back on the left, there is a partially imaged at least 5.4 x 3.8 cm oval lesion abutting or contiguous with the overlying skin, which is favored to reflect an epidermal inclusion or sebaceous cyst. The trachea is clear. There is no focal consolidation. There are a few scattered punctate pulmonary nodules, which are  nonspecific.        1.  No acute pulmonary embolism to the segmental pulmonary artery level. 2.  Partially imaged at least 5.4 cm subcutaneous lesion overlying the lower left back, favored to reflect an epidermal inclusion or sebaceous cyst. Correlate with direct inspection. Targeted ultrasound could be performed for confirmation, as clinically directed 3.  A few scattered punctate pulmonary nodules are nonspecific. Consider follow-up CT chest in one year.  Discussed with Dr. Jeffrey at 1:46 PM.  This report was finalized on 2022 1:46 PM by Dr. Jovita Yang M.D.        I ordered the above noted radiological studies. Reviewed by me and discussed with radiologist.  See dictation for official radiology interpretation.      PROCEDURES    Procedures      MEDICATIONS GIVEN IN ER    Medications   sodium chloride 0.9 % flush 10 mL (10 mL Intravenous Given 22 1056)   aspirin chewable tablet 324 mg (324 mg Oral Given 22 1146)   lactated ringers bolus 1,000 mL (1,000 mL Intravenous New Bag 22 1235)   iopamidol (ISOVUE-370) 76 % injection 100 mL (95 mL Intravenous Given 22 1320)         PROGRESS, DATA ANALYSIS, CONSULTS, AND MEDICAL DECISION MAKING    All labs have been independently reviewed by me.  All radiology studies have been reviewed by me and discussed with radiologist dictating the report.   EKG's independently viewed and interpreted by me.  Discussion below represents my analysis of pertinent findings related to patient's condition, differential diagnosis, treatment plan and final disposition.    Differential diagnosis includes but not limited to acute coronary syndrome, pulmonary embolism, thoracic aortic dissection, pneumonia, pneumothorax, musculoskeletal pain, GERD or esophageal spasm, anxiety, myocarditis/pericarditis, esophageal rupture, pancreatitis.     History: 1  EC  Age: 0  Risk factors: 1    HEART score: 2    ED Course as of 22 1421   Wed 2022   1102 EKG  interpreted by myself.  Time 10:43 AM.  Sinus rhythm.  Heart rate 72.  Normal intervals and axis.  No acute ST abnormality. [TD]   1105 On medical chart review, patient saw Dr. Valdez on 3/3/2021.  He has a history of H. pylori gastritis. [TD]   1227 D-Dimer, Quant(!): 0.50 [TD]   1413 CT imaging shows no evidence of PE.  He has a few scattered punctate pulmonary nodules with recommended follow-up chest CT in 1 year.  Suspected sebaceous or inclusion cysts to the left lower back. [TD]   1413 Troponin T: <0.010 [TD]      ED Course User Index  [TD] Duran Jeffrey II, MD     I answered questions in detail.  I recommended repeat CT scan in 1 year per radiology report.I reexamined the patient.  He is feeling well.  He looks systemically well-appearing.    PPE: The patient wore a surgical mask throughout the entire patient encounter. I wore an N95.    AS OF 14:21 EST VITALS:    BP - 117/75  HR - 84  TEMP - 97.6 °F (36.4 °C) (Tympanic)  O2 SATS - 96%        DIAGNOSIS  Final diagnoses:   Atypical chest pain   Pulmonary nodules         DISPOSITION  DISCHARGE    FOLLOW-UP  Dalton Jaramillo MD  [facility] [address]  [phone]    Schedule an appointment as soon as possible for a visit in 1 week  You should have a repeat CT scan of your lungs in 1 year         Medication List      No changes were made to your prescriptions during this visit.                  Duran Jeffrey II, MD  11/23/22 8779

## 2022-11-23 NOTE — ED TRIAGE NOTES
Patient to ed from  with complaints of left sided cp that started last Thursday but got worse overnight. Pain radiates to back.

## 2022-11-29 ENCOUNTER — OFFICE VISIT (OUTPATIENT)
Dept: FAMILY MEDICINE CLINIC | Facility: CLINIC | Age: 42
End: 2022-11-29

## 2022-11-29 VITALS
DIASTOLIC BLOOD PRESSURE: 92 MMHG | BODY MASS INDEX: 37.99 KG/M2 | HEART RATE: 82 BPM | HEIGHT: 76 IN | OXYGEN SATURATION: 98 % | WEIGHT: 312 LBS | RESPIRATION RATE: 16 BRPM | SYSTOLIC BLOOD PRESSURE: 124 MMHG

## 2022-11-29 DIAGNOSIS — R07.89 ATYPICAL CHEST PAIN: Primary | ICD-10-CM

## 2022-11-29 PROCEDURE — 99213 OFFICE O/P EST LOW 20 MIN: CPT | Performed by: NURSE PRACTITIONER

## 2022-11-29 RX ORDER — MELOXICAM 15 MG/1
15 TABLET ORAL DAILY
Qty: 14 TABLET | Refills: 0 | Status: SHIPPED | OUTPATIENT
Start: 2022-11-29 | End: 2022-12-06

## 2022-11-29 NOTE — PROGRESS NOTES
Subjective   Norberto Jarrell is a 42 y.o. male.     History of Present Illness   Dr alan patient. New to this provider. ER follow up.     ER follow up. Still having Left sided chest pain  (now 1.5  Weeks) that shoots through to shoulder blade. Some finger tingling. Some SOA with exertion walking up steps. He has tried aspirin, tylenol for pain. He is sleeping. No falls, no lifting or straining.  Lying on R side is most comfortable and walking feels better.   ER visit 11/23/22 for atypical chest pain.  CXRAY normal., EKG normal, CT angiogram normal (scattered punctate nodules, recommened 1 year CT follow up, SQ lesion on left lower back 5.4 cm, pt reports this is cyst that has been thee forever.   He has MIGUEL and he is not able to use the mask he has. He is drinking alcohol.     The following portions of the patient's history were reviewed and updated as appropriate: allergies, current medications, past family history, past medical history, past social history, past surgical history and problem list.    Review of Systems    Objective   Physical Exam  Constitutional:       Appearance: Normal appearance.   HENT:      Nose: Nose normal.      Mouth/Throat:      Mouth: Mucous membranes are moist.   Cardiovascular:      Rate and Rhythm: Normal rate and regular rhythm.      Pulses: Normal pulses.      Heart sounds: Normal heart sounds.   Pulmonary:      Effort: Pulmonary effort is normal.      Breath sounds: Normal breath sounds.   Abdominal:      General: Bowel sounds are normal.      Palpations: Abdomen is soft.   Musculoskeletal:         General: Normal range of motion.      Cervical back: Normal range of motion and neck supple.   Skin:     General: Skin is warm and dry.             Comments: Large 5 cm cyst   Neurological:      General: No focal deficit present.      Mental Status: He is alert.   Psychiatric:         Mood and Affect: Mood is anxious.         Vitals:    11/29/22 1538   BP: 124/92   Pulse: 82   Resp: 16    SpO2: 98%     Body mass index is 37.98 kg/m².    Procedures    Assessment & Plan   Problems Addressed this Visit    None  Visit Diagnoses     Atypical chest pain    -  Primary    Relevant Orders    Ambulatory Referral to Cardiology      Diagnoses       Codes Comments    Atypical chest pain    -  Primary ICD-10-CM: R07.89  ICD-9-CM: 786.59         Atypical chest pain- pt is upset and wants to see cardio, referral sent, e-return to ER for s/sx MI, chest pressure, pain w associated sx (diaphoresis, soa, L jaw or L arm pain)    MIGUEL- pt to call sleep med and get appt to trial new mask         Education provided in AVS   Return if symptoms worsen or fail to improve.

## 2022-12-02 ENCOUNTER — OFFICE VISIT (OUTPATIENT)
Dept: FAMILY MEDICINE CLINIC | Facility: CLINIC | Age: 42
End: 2022-12-02

## 2022-12-02 ENCOUNTER — TELEPHONE (OUTPATIENT)
Dept: FAMILY MEDICINE CLINIC | Facility: CLINIC | Age: 42
End: 2022-12-02

## 2022-12-02 VITALS
OXYGEN SATURATION: 98 % | WEIGHT: 307 LBS | HEART RATE: 78 BPM | SYSTOLIC BLOOD PRESSURE: 128 MMHG | RESPIRATION RATE: 18 BRPM | BODY MASS INDEX: 37.37 KG/M2 | DIASTOLIC BLOOD PRESSURE: 82 MMHG

## 2022-12-02 DIAGNOSIS — R14.2 BELCHING: ICD-10-CM

## 2022-12-02 DIAGNOSIS — R07.89 ATYPICAL CHEST PAIN: Primary | ICD-10-CM

## 2022-12-02 PROCEDURE — 99213 OFFICE O/P EST LOW 20 MIN: CPT | Performed by: NURSE PRACTITIONER

## 2022-12-02 RX ORDER — OMEPRAZOLE 20 MG/1
20 CAPSULE, DELAYED RELEASE ORAL DAILY
Qty: 30 CAPSULE | Refills: 1
Start: 2022-12-02

## 2022-12-02 NOTE — TELEPHONE ENCOUNTER
Pt seen in ED 11/23/2022, Appt with Ute on 11/29/2022, Appt with Hilda on 12/2/2022 all for the same issue.

## 2022-12-02 NOTE — PATIENT INSTRUCTIONS
Start omeprazole 20mg 1p.o. QD  Follow-up with cardiology as scheduled  Advised patient to go to ER if chest pain worsens or if he develops any further cardiac symptoms.   Return if symptoms worsen or fail to improve.

## 2022-12-02 NOTE — TELEPHONE ENCOUNTER
Patient was seen by Ashlye Burch today.  Spouse called this afternoon asking if he could be scheduled with Dr. Jaramillo on Monday.  No openings at present.  Patient to contact Dr. Jaramillo thru mychart for suggestions about appointment.    Again reminded Kaia to take patient to ED if pain chest worsens or other symptoms begin.

## 2022-12-02 NOTE — PROGRESS NOTES
Subjective   Norberto Jarrell is a 42 y.o. male.     History of Present Illness    Patient presents with atypical chest pain, ongoing. He was seen on 11/23/2022 at ER and had cardiac workup.  He had CT which was essentially normal, with the exception of lung nodules and it was recommended that he have follow-up in 1 year.  He was seen in our office on 11/29/2022 again for atypical chest pain. At the time he requested referral to cardiology, which he reports that he has an appointment with. Patient reports that he feels the pain is worse and that the meloxicam that he was given is not helping much. He reports today that he's having increased burping.  I advised that he start omeprazole over the counter and stressed that if his pain worsened over the weekend that he needed to go back to the ER.  Patient verbalized understanding.     The following portions of the patient's history were reviewed and updated as appropriate: allergies, current medications, past family history, past medical history, past social history, past surgical history and problem list.    Review of Systems   Constitutional: Negative for chills, fatigue and fever.   Eyes: Negative for blurred vision and double vision.   Respiratory: Positive for shortness of breath. Negative for cough, chest tightness and wheezing.    Cardiovascular: Positive for chest pain (Atypical ). Negative for palpitations and leg swelling.   Neurological: Negative for dizziness, weakness and headache.       Objective   Physical Exam  Vitals and nursing note reviewed.   Constitutional:       Appearance: He is well-developed.   HENT:      Head: Normocephalic and atraumatic.   Eyes:      Conjunctiva/sclera: Conjunctivae normal.      Pupils: Pupils are equal, round, and reactive to light.   Neck:      Thyroid: No thyromegaly.   Cardiovascular:      Rate and Rhythm: Normal rate and regular rhythm.      Pulses: Normal pulses.      Heart sounds: Normal heart sounds. No murmur heard.     No friction rub. No gallop.   Pulmonary:      Effort: Pulmonary effort is normal.      Breath sounds: Normal breath sounds.   Musculoskeletal:      Cervical back: Normal range of motion and neck supple.      Right lower leg: No edema.      Left lower leg: No edema.   Lymphadenopathy:      Cervical: No cervical adenopathy.   Skin:     General: Skin is warm and dry.      Capillary Refill: Capillary refill takes 2 to 3 seconds.   Neurological:      Mental Status: He is alert and oriented to person, place, and time.      Cranial Nerves: No cranial nerve deficit.   Psychiatric:         Behavior: Behavior normal.         Thought Content: Thought content normal.         Judgment: Judgment normal.         Vitals:    12/02/22 1312   BP: 128/82   Pulse: 78   Resp: 18   SpO2: 98%     Body mass index is 37.37 kg/m².      Procedures    Assessment & Plan   Problems Addressed this Visit    None  Visit Diagnoses     Atypical chest pain    -  Primary    Relevant Medications    omeprazole (priLOSEC) 20 MG capsule    Belching        Relevant Medications    omeprazole (priLOSEC) 20 MG capsule      Diagnoses       Codes Comments    Atypical chest pain    -  Primary ICD-10-CM: R07.89  ICD-9-CM: 786.59     Belching     ICD-10-CM: R14.2  ICD-9-CM: 787.3         Reviewed recent imaging and labs from ER visit 11/23/2022  Reviewed practitioner note from 11/29/2022  Start omeprazole 20 mg 1 p.o. QD  Follow-up with cardiology as scheduled  Advised patient to go to ER if chest pain worsens or if he develops any further cardiac symptoms.   Included food choices for GERD as suggestions due to belching         Return if symptoms worsen or fail to improve.

## 2024-02-01 ENCOUNTER — TELEPHONE (OUTPATIENT)
Dept: FAMILY MEDICINE CLINIC | Facility: CLINIC | Age: 44
End: 2024-02-01
Payer: COMMERCIAL

## 2024-02-01 NOTE — TELEPHONE ENCOUNTER
Patient had CT of chest November 2022 while in the ED.  Nodules noticed and 1 year follow up recommended.  Would Dr. Jaramillo enter an order?

## 2024-02-19 ENCOUNTER — OFFICE VISIT (OUTPATIENT)
Dept: FAMILY MEDICINE CLINIC | Facility: CLINIC | Age: 44
End: 2024-02-19
Payer: COMMERCIAL

## 2024-02-19 VITALS
HEART RATE: 79 BPM | DIASTOLIC BLOOD PRESSURE: 98 MMHG | HEIGHT: 76 IN | OXYGEN SATURATION: 96 % | BODY MASS INDEX: 36.9 KG/M2 | SYSTOLIC BLOOD PRESSURE: 142 MMHG | RESPIRATION RATE: 18 BRPM | WEIGHT: 303 LBS

## 2024-02-19 DIAGNOSIS — L72.0 EPIDERMOID CYST OF SKIN OF BACK: ICD-10-CM

## 2024-02-19 DIAGNOSIS — Z11.59 ENCOUNTER FOR HEPATITIS C SCREENING TEST FOR LOW RISK PATIENT: ICD-10-CM

## 2024-02-19 DIAGNOSIS — Z13.6 ENCOUNTER FOR LIPID SCREENING FOR CARDIOVASCULAR DISEASE: ICD-10-CM

## 2024-02-19 DIAGNOSIS — Z13.1 SCREENING FOR DIABETES MELLITUS: ICD-10-CM

## 2024-02-19 DIAGNOSIS — Z23 NEED FOR VACCINATION: ICD-10-CM

## 2024-02-19 DIAGNOSIS — R91.8 LUNG NODULE, MULTIPLE: ICD-10-CM

## 2024-02-19 DIAGNOSIS — Z13.220 ENCOUNTER FOR LIPID SCREENING FOR CARDIOVASCULAR DISEASE: ICD-10-CM

## 2024-02-19 DIAGNOSIS — Z00.00 ROUTINE HEALTH MAINTENANCE: Primary | ICD-10-CM

## 2024-02-19 DIAGNOSIS — E66.09 CLASS 2 OBESITY DUE TO EXCESS CALORIES WITHOUT SERIOUS COMORBIDITY WITH BODY MASS INDEX (BMI) OF 36.0 TO 36.9 IN ADULT: ICD-10-CM

## 2024-02-19 PROBLEM — E66.812 CLASS 2 OBESITY DUE TO EXCESS CALORIES WITHOUT SERIOUS COMORBIDITY WITH BODY MASS INDEX (BMI) OF 36.0 TO 36.9 IN ADULT: Status: ACTIVE | Noted: 2024-02-19

## 2024-02-19 PROCEDURE — 90715 TDAP VACCINE 7 YRS/> IM: CPT | Performed by: FAMILY MEDICINE

## 2024-02-19 PROCEDURE — 90471 IMMUNIZATION ADMIN: CPT | Performed by: FAMILY MEDICINE

## 2024-02-19 PROCEDURE — 99396 PREV VISIT EST AGE 40-64: CPT | Performed by: FAMILY MEDICINE

## 2024-02-19 NOTE — PROGRESS NOTES
"Chief Complaint  Annual Exam    Subjective    History of Present Illness    Norberto Jarrell presents to [facility] for Annual Exam.  History of Present Illness     Here today for annual exam and discuss preventive health maintenance.    Doing fairly well overall today. Needs a referral for repeat CT scan as a CT angiography done last year showed multiple small lung nodules and recommended 1 year follow-up.    Imaging also showed a rather large cyst on his back. Has been present for many years, occasionally bothersome. Finds that he is aware of it more frequently and would like it removed if possible. Cannot recall any specific inciting event that created it. Has never drained spontaneously to his knowledge.    Due for some routine blood work today. Also due for a tetanus booster. Did not receive a COVID vaccination in the fall though just had a mild case.    No major changes to diet or exercise. Weight is stable since last visit.    Has good family and social support. Gets regular dental exams.    Objective     Vital Signs:   /98   Pulse 79   Resp 18   Ht 193 cm (76\")   Wt (!) 137 kg (303 lb)   SpO2 96%   BMI 36.88 kg/m²   Physical Exam  Vitals and nursing note reviewed.   Constitutional:       General: He is not in acute distress.     Appearance: Normal appearance. He is not ill-appearing.   Cardiovascular:      Rate and Rhythm: Normal rate and regular rhythm.      Pulses: Normal pulses.      Heart sounds: Normal heart sounds. No murmur heard.  Pulmonary:      Effort: Pulmonary effort is normal. No respiratory distress.      Breath sounds: Normal breath sounds. No rales.   Skin:            Comments: Large, 5 to 6 cm in diameter, soft cystic mass on his mid back, appearance most consistent with either lipoma or epidermoid cyst.   Neurological:      Mental Status: He is alert and oriented to person, place, and time. Mental status is at baseline.   Psychiatric:         Mood and " Affect: Mood normal.         Behavior: Behavior normal.                          Assessment and Plan   Diagnoses and all orders for this visit:    1. Routine health maintenance (Primary)    2. Lung nodule, multiple  -     CT Chest Without Contrast; Future    3. Epidermoid cyst of skin of back  -     Ambulatory Referral to General Surgery    4. Class 2 obesity due to excess calories without serious comorbidity with body mass index (BMI) of 36.0 to 36.9 in adult    5. Screening for diabetes mellitus  -     Comprehensive Metabolic Panel    6. Encounter for lipid screening for cardiovascular disease  -     Lipid Panel    7. Encounter for hepatitis C screening test for low risk patient  -     Hepatitis C Antibody    8. Need for vaccination  -     Tdap Vaccine Greater Than or Equal To 8yo IM    Orders as above. I will contact him with lab results as available. Ordered chest CT as discussed. Referred to general surgery for further evaluation and management of the back cyst.    Class 2 Severe Obesity (BMI >=35 and <=39.9). Obesity-related health conditions include the following: none. Obesity is unchanged. BMI is is above average; BMI management plan is completed. We discussed portion control and increasing exercise.    Encouraged to continue working on healthy lifestyle habits. Recommended follow up as below. Encouraged communication via [employer] in the meantime.     Patient was given instructions and counseling regarding his condition or for health maintenance advice. Please see specific information pulled into the AVS (placed there by myself) if appropriate.    Return in about 1 year (around 2/19/2025), or if symptoms worsen or fail to improve, for Preventive Health Maintenance.    SHANDRA Jaramillo MD    Prevention counseling performed as below: Mindfulness for stress management.

## 2024-02-19 NOTE — PATIENT INSTRUCTIONS
Mindfulness apps: Headspace, Smiling Mind, Otho!    Mindfulness-Based Stress Reduction  Mindfulness-based stress reduction (MBSR) is a program that helps people learn to practice mindfulness. Mindfulness is the practice of intentionally paying attention to the present moment. It can be learned and practiced through techniques such as education, breathing exercises, meditation, and yoga. MBSR includes several mindfulness techniques in one program.  MBSR works best when you understand the treatment, are willing to try new things, and can commit to spending time practicing what you learn. MBSR training may include learning about:  How your emotions, thoughts, and reactions affect your body.  New ways to respond to things that cause negative thoughts to start (triggers).  How to notice your thoughts and let go of them.  Practicing awareness of everyday things that you normally do without thinking.  The techniques and goals of different types of meditation.  What are the benefits of MBSR?  MBSR can have many benefits, which include helping you to:  Develop self-awareness. This refers to knowing and understanding yourself.  Learn skills and attitudes that help you to participate in your own health care.  Learn new ways to care for yourself.  Be more accepting about how things are, and let things go.  Be less judgmental and approach things with an open mind.  Be patient with yourself and trust yourself more.  MBSR has also been shown to:  Reduce negative emotions, such as depression and anxiety.  Improve memory and focus.  Change how you sense and approach pain.  Boost your body's ability to fight infections.  Help you connect better with other people.  Improve your sense of well-being.  Follow these instructions at home:    Find a local in-person or online MBSR program.  Set aside some time regularly for mindfulness practice.  Find a mindfulness practice that works best for you. This may include one or more of the  following:  Meditation. Meditation involves focusing your mind on a certain thought or activity.  Breathing awareness exercises. These help you to stay present by focusing on your breath.  Body scan. For this practice, you lie down and pay attention to each part of your body from head to toe. You can identify tension and soreness and intentionally relax parts of your body.  Yoga. Yoga involves stretching and breathing, and it can improve your ability to move and be flexible. It can also provide an experience of testing your body's limits, which can help you release stress.  Mindful eating. This way of eating involves focusing on the taste, texture, color, and smell of each bite of food. Because this slows down eating and helps you feel full sooner, it can be an important part of a weight-loss plan.  Find a podcast or recording that provides guidance for breathing awareness, body scan, or meditation exercises. You can listen to these any time when you have a free moment to rest without distractions.  Follow your treatment plan as told by your health care provider. This may include taking regular medicines and making changes to your diet or lifestyle as recommended.  How to practice mindfulness  To do a basic awareness exercise:  Find a comfortable place to sit.  Pay attention to the present moment. Observe your thoughts, feelings, and surroundings just as they are.  Avoid placing judgment on yourself, your feelings, or your surroundings. Make note of any judgment that comes up, and let it go.  Your mind may wander, and that is okay. Make note of when your thoughts drift, and return your attention to the present moment.  To do basic mindfulness meditation:  Find a comfortable place to sit. This may include a stable chair or a firm floor cushion.  Sit upright with your back straight. Let your arms fall next to your side with your hands resting on your legs.  If sitting in a chair, rest your feet flat on the floor.  If  sitting on a cushion, cross your legs in front of you.  Keep your head in a neutral position with your chin dropped slightly. Relax your jaw and rest the tip of your tongue on the roof of your mouth. Drop your gaze to the floor. You can close your eyes if you like.  Breathe normally and pay attention to your breath. Feel the air moving in and out of your nose. Feel your belly expanding and relaxing with each breath.  Your mind may wander, and that is okay. Make note of when your thoughts drift, and return your attention to your breath.  Avoid placing judgment on yourself, your feelings, or your surroundings. Make note of any judgment or feelings that come up, let them go, and bring your attention back to your breath.  When you are ready, lift your gaze or open your eyes. Pay attention to how your body feels after the meditation.  Where to find more information  You can find more information about MBSR from:  Your health care provider.  Community-based meditation centers or programs.  Programs offered near you.  Summary  Mindfulness-based stress reduction (MBSR) is a program that teaches you how to intentionally pay attention to the present moment. It is used with other treatments to help you cope better with daily stress, emotions, and pain.  MBSR focuses on developing self-awareness, which allows you to respond to life stress without judgment or negative emotions.  MBSR programs may involve learning different mindfulness practices, such as breathing exercises, meditation, yoga, body scan, or mindful eating. Find a mindfulness practice that works best for you, and set aside time for it on a regular basis.  This information is not intended to replace advice given to you by your health care provider. Make sure you discuss any questions you have with your health care provider.  Document Released: 04/26/2018 Document Revised: 11/30/2018 Document Reviewed: 04/26/2018  Elsevier Patient Education © 2020 [employer]

## 2024-02-20 LAB
ALBUMIN SERPL-MCNC: 4.4 G/DL (ref 4.1–5.1)
ALBUMIN/GLOB SERPL: 1.6 {RATIO} (ref 1.2–2.2)
ALP SERPL-CCNC: 86 IU/L (ref 44–121)
ALT SERPL-CCNC: 40 IU/L (ref 0–44)
AST SERPL-CCNC: 28 IU/L (ref 0–40)
BILIRUB SERPL-MCNC: 0.5 MG/DL (ref 0–1.2)
BUN SERPL-MCNC: 14 MG/DL (ref 6–24)
BUN/CREAT SERPL: 16 (ref 9–20)
CALCIUM SERPL-MCNC: 9.2 MG/DL (ref 8.7–10.2)
CHLORIDE SERPL-SCNC: 101 MMOL/L (ref 96–106)
CHOLEST SERPL-MCNC: 200 MG/DL (ref 100–199)
CO2 SERPL-SCNC: 27 MMOL/L (ref 20–29)
CREAT SERPL-MCNC: 0.85 MG/DL (ref 0.76–1.27)
EGFRCR SERPLBLD CKD-EPI 2021: 110 ML/MIN/1.73
GLOBULIN SER CALC-MCNC: 2.7 G/DL (ref 1.5–4.5)
GLUCOSE SERPL-MCNC: 99 MG/DL (ref 70–99)
HCV IGG SERPL QL IA: NON REACTIVE
HDLC SERPL-MCNC: 60 MG/DL
LDLC SERPL CALC-MCNC: 124 MG/DL (ref 0–99)
POTASSIUM SERPL-SCNC: 4.2 MMOL/L (ref 3.5–5.2)
PROT SERPL-MCNC: 7.1 G/DL (ref 6–8.5)
SODIUM SERPL-SCNC: 141 MMOL/L (ref 134–144)
TRIGL SERPL-MCNC: 91 MG/DL (ref 0–149)
VLDLC SERPL CALC-MCNC: 16 MG/DL (ref 5–40)

## 2024-03-18 ENCOUNTER — OFFICE VISIT (OUTPATIENT)
Dept: FAMILY MEDICINE CLINIC | Facility: CLINIC | Age: 44
End: 2024-03-18
Payer: COMMERCIAL

## 2024-03-18 VITALS
RESPIRATION RATE: 18 BRPM | HEIGHT: 76 IN | BODY MASS INDEX: 34.46 KG/M2 | HEART RATE: 78 BPM | DIASTOLIC BLOOD PRESSURE: 116 MMHG | OXYGEN SATURATION: 98 % | SYSTOLIC BLOOD PRESSURE: 176 MMHG | WEIGHT: 283 LBS

## 2024-03-18 DIAGNOSIS — I10 PRIMARY HYPERTENSION: ICD-10-CM

## 2024-03-18 DIAGNOSIS — R35.0 URINARY FREQUENCY: Primary | ICD-10-CM

## 2024-03-18 LAB
BILIRUB BLD-MCNC: NEGATIVE MG/DL
CLARITY, POC: CLEAR
COLOR UR: YELLOW
GLUCOSE UR STRIP-MCNC: NEGATIVE MG/DL
KETONES UR QL: NEGATIVE
LEUKOCYTE EST, POC: NEGATIVE
NITRITE UR-MCNC: NEGATIVE MG/ML
PH UR: 7.5 [PH] (ref 5–8)
PROT UR STRIP-MCNC: NEGATIVE MG/DL
RBC # UR STRIP: NEGATIVE /UL
SP GR UR: 1 (ref 1–1.03)
UROBILINOGEN UR QL: NORMAL

## 2024-03-18 PROCEDURE — 81002 URINALYSIS NONAUTO W/O SCOPE: CPT | Performed by: NURSE PRACTITIONER

## 2024-03-18 PROCEDURE — 99214 OFFICE O/P EST MOD 30 MIN: CPT | Performed by: NURSE PRACTITIONER

## 2024-03-18 RX ORDER — OLMESARTAN MEDOXOMIL 40 MG/1
TABLET ORAL
Qty: 30 TABLET | Refills: 2 | Status: SHIPPED | OUTPATIENT
Start: 2024-03-18

## 2024-03-18 NOTE — PROGRESS NOTES
Subjective   Norberto Jarrell is a 44 y.o. male.     History of Present Illness   Patient presents with c/o left flank pain and urinary frequency that started about 5 o'clock this morning. He reports flushed feeling and his heart rate feeling as if it were elevated. Patient's blood pressure is elevated at visit and was high when he was in the office in February and in March when he was seen at urgent care. He reports that his diet is okay. He drinks alcohol daily.     The following portions of the patient's history were reviewed and updated as appropriate: allergies, current medications, past family history, past medical history, past social history, past surgical history and problem list.    Review of Systems   Constitutional:  Negative for chills, fatigue and fever.   Eyes:  Negative for blurred vision and double vision.   Respiratory:  Negative for cough, chest tightness, shortness of breath and wheezing.    Cardiovascular:  Negative for chest pain, palpitations and leg swelling.   Genitourinary:  Positive for flank pain (left flank).   Neurological:  Negative for dizziness, weakness and headache.       Objective   Physical Exam  Vitals and nursing note reviewed.   Constitutional:       Appearance: Normal appearance. He is well-developed. He is obese.   HENT:      Head: Normocephalic and atraumatic.   Eyes:      Conjunctiva/sclera: Conjunctivae normal.      Pupils: Pupils are equal, round, and reactive to light.   Neck:      Thyroid: No thyromegaly.   Cardiovascular:      Rate and Rhythm: Normal rate and regular rhythm.      Pulses: Normal pulses.      Heart sounds: Normal heart sounds. No murmur heard.     No friction rub. No gallop.   Pulmonary:      Effort: Pulmonary effort is normal.      Breath sounds: Normal breath sounds.   Musculoskeletal:      Cervical back: Normal range of motion and neck supple.   Lymphadenopathy:      Cervical: No cervical adenopathy.   Skin:     General: Skin is warm and dry.       Capillary Refill: Capillary refill takes 2 to 3 seconds.   Neurological:      Mental Status: He is alert and oriented to person, place, and time.      Cranial Nerves: No cranial nerve deficit.   Psychiatric:         Behavior: Behavior normal.         Thought Content: Thought content normal.         Judgment: Judgment normal.         Vitals:    03/18/24 1457   BP: (!) 176/116   Pulse: 78   Resp: 18   SpO2: 98%     Body mass index is 34.45 kg/m².      Procedures    Assessment & Plan   Problems Addressed this Visit    None  Visit Diagnoses       Urinary frequency    -  Primary    Relevant Orders    POC Urinalysis Dipstick    Urine Culture - Urine, Urine, Clean Catch    Primary hypertension        Relevant Medications    olmesartan (BENICAR) 40 MG tablet          Diagnoses         Codes Comments    Urinary frequency    -  Primary ICD-10-CM: R35.0  ICD-9-CM: 788.41     Primary hypertension     ICD-10-CM: I10  ICD-9-CM: 401.9           POCT urinalysis dipstick  Urine culture  Olmesartan 40 mg 1p.o. QD. Patient is to take 1/2 tab X 3 days then increase to 1 tablet daily  Monitor your blood pressure at home and keep log, bring this with you to next visit.    Encouraged patient decrease alcohol consumption  Increase exercise       Return in about 2 weeks (around 4/1/2024) for Recheck BP.

## 2024-03-18 NOTE — PATIENT INSTRUCTIONS
Monitor your blood pressure at home and keep log, bring this with you to next visit.    Olmesartan 40 mg 1p.o. QD. Patient is to take 1/2 tab X 3 days then increase to 1 tablet daily.  Return in about 2 weeks (around 4/1/2024) for Recheck BP.

## 2024-03-20 LAB
BACTERIA UR CULT: NO GROWTH
BACTERIA UR CULT: NORMAL

## 2024-03-21 ENCOUNTER — HOSPITAL ENCOUNTER (EMERGENCY)
Facility: HOSPITAL | Age: 44
Discharge: HOME | End: 2024-03-21
Attending: EMERGENCY MEDICINE
Payer: COMMERCIAL

## 2024-03-21 VITALS
SYSTOLIC BLOOD PRESSURE: 128 MMHG | HEIGHT: 76 IN | RESPIRATION RATE: 16 BRPM | OXYGEN SATURATION: 95 % | WEIGHT: 307.76 LBS | DIASTOLIC BLOOD PRESSURE: 84 MMHG | TEMPERATURE: 97.6 F | BODY MASS INDEX: 37.48 KG/M2 | HEART RATE: 71 BPM

## 2024-03-21 DIAGNOSIS — R73.09 ELEVATED HEMOGLOBIN A1C: ICD-10-CM

## 2024-03-21 DIAGNOSIS — I10 HYPERTENSION, UNSPECIFIED TYPE: ICD-10-CM

## 2024-03-21 DIAGNOSIS — R20.2 PARESTHESIA OF BOTH HANDS: ICD-10-CM

## 2024-03-21 DIAGNOSIS — R63.1 INCREASED THIRST: Primary | ICD-10-CM

## 2024-03-21 LAB
ALBUMIN SERPL-MCNC: 4.3 G/DL (ref 3.5–5.2)
ALBUMIN/GLOB SERPL: 1.4 G/DL
ALP SERPL-CCNC: 77 U/L (ref 39–117)
ALT SERPL W P-5'-P-CCNC: 35 U/L (ref 1–41)
ANION GAP SERPL CALCULATED.3IONS-SCNC: 13 MMOL/L (ref 5–15)
AST SERPL-CCNC: 37 U/L (ref 1–40)
B-OH-BUTYR SERPL-SCNC: 0.44 MMOL/L (ref 0.02–0.27)
BILIRUB SERPL-MCNC: 0.7 MG/DL (ref 0–1.2)
BILIRUB UR QL STRIP: NEGATIVE
BUN SERPL-MCNC: 12 MG/DL (ref 6–20)
BUN/CREAT SERPL: 16 (ref 7–25)
CALCIUM SPEC-SCNC: 9.2 MG/DL (ref 8.6–10.5)
CHLORIDE SERPL-SCNC: 102 MMOL/L (ref 98–107)
CLARITY UR: CLEAR
CO2 SERPL-SCNC: 24 MMOL/L (ref 22–29)
COLOR UR: YELLOW
CREAT SERPL-MCNC: 0.75 MG/DL (ref 0.76–1.27)
EGFRCR SERPLBLD CKD-EPI 2021: 114.1 ML/MIN/1.73
GLOBULIN UR ELPH-MCNC: 3 GM/DL
GLUCOSE SERPL-MCNC: 100 MG/DL (ref 65–99)
GLUCOSE UR STRIP-MCNC: NEGATIVE MG/DL
HBA1C MFR BLD: 5.9 % (ref 4.8–5.6)
HGB UR QL STRIP.AUTO: NEGATIVE
HOLD SPECIMEN: NORMAL
HOLD SPECIMEN: NORMAL
KETONES UR QL STRIP: NEGATIVE
LEUKOCYTE ESTERASE UR QL STRIP.AUTO: NEGATIVE
MAGNESIUM SERPL-MCNC: 2.2 MG/DL (ref 1.6–2.6)
NITRITE UR QL STRIP: NEGATIVE
PH UR STRIP.AUTO: 6.5 [PH] (ref 5–8)
POTASSIUM SERPL-SCNC: 4.1 MMOL/L (ref 3.5–5.2)
PROT SERPL-MCNC: 7.3 G/DL (ref 6–8.5)
PROT UR QL STRIP: NEGATIVE
SODIUM SERPL-SCNC: 139 MMOL/L (ref 136–145)
SP GR UR STRIP: <1.005 (ref 1–1.03)
UROBILINOGEN UR QL STRIP: ABNORMAL
WHOLE BLOOD HOLD COAG: NORMAL
WHOLE BLOOD HOLD SPECIMEN: NORMAL

## 2024-03-21 PROCEDURE — 99283 EMERGENCY DEPT VISIT LOW MDM: CPT

## 2024-03-21 PROCEDURE — 82010 KETONE BODYS QUAN: CPT | Performed by: PHYSICIAN ASSISTANT

## 2024-03-21 PROCEDURE — 81003 URINALYSIS AUTO W/O SCOPE: CPT | Performed by: PHYSICIAN ASSISTANT

## 2024-03-21 PROCEDURE — 80053 COMPREHEN METABOLIC PANEL: CPT | Performed by: PHYSICIAN ASSISTANT

## 2024-03-21 PROCEDURE — 83036 HEMOGLOBIN GLYCOSYLATED A1C: CPT | Performed by: PHYSICIAN ASSISTANT

## 2024-03-21 PROCEDURE — 83735 ASSAY OF MAGNESIUM: CPT | Performed by: PHYSICIAN ASSISTANT

## 2024-03-21 RX ORDER — SODIUM CHLORIDE 0.9 % (FLUSH) 0.9 %
10 SYRINGE (ML) INJECTION AS NEEDED
Status: DISCONTINUED | OUTPATIENT
Start: 2024-03-21 | End: 2024-03-21 | Stop reason: HOSPADM

## 2024-03-21 NOTE — DISCHARGE INSTRUCTIONS
Check and record your blood pressure regularly.  Follow-up with your primary care provider soon as possible.

## 2024-03-21 NOTE — ED NOTES
Numbness and tingling in his hands started a few days ago and has been worsening.  He has extreme thirst and increased urination

## 2024-03-21 NOTE — ED PROVIDER NOTES
EMERGENCY DEPARTMENT ENCOUNTER  Room Number:  18/18  PCP: Dalton Jaramillo MD  Independent Historians: Patient      HPI:  Chief Complaint: had concerns including Urinary Frequency and Polydipsia.     A complete HPI/ROS/PMH/PSH/SH/FH are unobtainable due to: None    Chronic or social conditions impacting patient care (Social Determinants of Health): None      Context: Norberto Jarrell is a 44 y.o. male with a medical history of anxiety, depression, MIGUEL, and obesity who presents to the ED c/o acute polyuria and polydipsia.  Patient reports his symptoms for started on Monday.  He also reports tingling in bilateral hands and bilateral feet.  He saw his PCP on Monday, was told his blood pressure was elevated, and was started on olmesartan.  Called today due to persistent symptoms and was told they could not see him for 3 weeks.  No personal or family history of diabetes.  Patient denies any known sick contacts.  Patient denies fever, nausea, vomiting, abdominal pain, or any other systemic complaints.      Review of prior external notes (non-ED) -and- Review of prior external test results outside of this encounter:  Patient seen in office by PCP on 3/18/2024 for urinary frequency and hypertension.  Reviewed assessment and plan.  Will start patient on olmesartan.  Reviewed labs collected on 2/19/2024.  Lipid panel with total cholesterol 200, CMP with creatinine 0.85.    Prescription drug monitoring program review:     N/A    PAST MEDICAL HISTORY  Active Ambulatory Problems     Diagnosis Date Noted   • Anxiety and depression 07/01/2021   • Obstructive sleep apnea 08/11/2021   • Dyshidrotic eczema 08/17/2021   • Class 2 obesity due to excess calories without serious comorbidity with body mass index (BMI) of 36.0 to 36.9 in adult 02/19/2024     Resolved Ambulatory Problems     Diagnosis Date Noted   • Abdominal pain 02/10/2021     No Additional Past Medical History         PAST SURGICAL HISTORY  Past Surgical History:    Procedure Laterality Date   • ANKLE SURGERY Left    • ENDOSCOPY N/A 3/3/2021    Procedure: ESOPHAGOGASTRODUODENOSCOPY WITH COLD BX'S;  Surgeon: Diego Weston MD;  Location: [facility] ENDOSCOPY;  Service: Gastroenterology;  Laterality: N/A;  pre: ABDOMINAL PAIN  post: GASTRITIS         FAMILY HISTORY  Family History   Problem Relation Age of Onset   • Skin cancer Mother    • Hyperlipidemia Father    • No Known Problems Sister    • Heart disease Maternal Grandfather    • Liver disease Paternal Grandmother    • Lung cancer Paternal Grandfather    • Prostate cancer Neg Hx    • Colon cancer Neg Hx          SOCIAL HISTORY  Social History     Socioeconomic History   • Marital status:    Tobacco Use   • Smoking status: Former     Current packs/day: 0.00     Average packs/day: 1 pack/day for 7.0 years (7.0 ttl pk-yrs)     Types: Cigarettes     Start date:      Quit date:      Years since quittin.2   • Smokeless tobacco: Former   Vaping Use   • Vaping status: Never Used   Substance and Sexual Activity   • Alcohol use: Yes     Alcohol/week: 14.0 standard drinks of alcohol     Types: 14 Cans of beer per week   • Drug use: Never   • Sexual activity: Yes     Partners: Female     Comment: , one kid         ALLERGIES  Patient has no known allergies.      REVIEW OF SYSTEMS  Review of Systems   Constitutional:  Negative for chills and fever.   HENT:  Negative for ear pain and sore throat.    Respiratory:  Negative for cough and shortness of breath.    Cardiovascular:  Negative for chest pain and palpitations.   Gastrointestinal:  Negative for abdominal pain and vomiting.   Genitourinary:  Positive for frequency. Negative for dysuria and hematuria.   Musculoskeletal:  Negative for arthralgias and joint swelling.   Skin:  Negative for pallor and rash.   Neurological:  Negative for syncope and headaches.   Psychiatric/Behavioral:  Negative for confusion and hallucinations.      Included in HPI  All  systems reviewed and negative except for those discussed in HPI.      PHYSICAL EXAM    I have reviewed the triage vital signs and nursing notes.    ED Triage Vitals [03/21/24 1008]   Temp Heart Rate Resp BP SpO2   97.6 °F (36.4 °C) 80 16 -- 100 %      Temp src Heart Rate Source Patient Position BP Location FiO2 (%)   Tympanic Monitor -- -- --       Physical Exam  Constitutional:       General: He is not in acute distress.     Appearance: Normal appearance.   HENT:      Head: Normocephalic and atraumatic.      Nose: Nose normal.      Mouth/Throat:      Mouth: Mucous membranes are moist.   Eyes:      Conjunctiva/sclera: Conjunctivae normal.      Pupils: Pupils are equal, round, and reactive to light.   Cardiovascular:      Rate and Rhythm: Normal rate and regular rhythm.      Pulses: Normal pulses.      Heart sounds: Normal heart sounds.      Comments: Distal pulses intact  Pulmonary:      Effort: Pulmonary effort is normal.      Breath sounds: Normal breath sounds.   Abdominal:      General: There is no distension.   Musculoskeletal:         General: Normal range of motion.      Cervical back: Normal range of motion and neck supple.   Skin:     General: Skin is warm.      Capillary Refill: Capillary refill takes less than 2 seconds.   Neurological:      General: No focal deficit present.      Mental Status: He is alert and oriented to person, place, and time.   Psychiatric:         Mood and Affect: Mood normal.           LAB RESULTS  Recent Results (from the past 24 hour(s))   Comprehensive Metabolic Panel    Collection Time: 03/21/24 10:35 AM    Specimen: Blood   Result Value Ref Range    Glucose 100 (H) 65 - 99 mg/dL    BUN 12 6 - 20 mg/dL    Creatinine 0.75 (L) 0.76 - 1.27 mg/dL    Sodium 139 136 - 145 mmol/L    Potassium 4.1 3.5 - 5.2 mmol/L    Chloride 102 98 - 107 mmol/L    CO2 24.0 22.0 - 29.0 mmol/L    Calcium 9.2 8.6 - 10.5 mg/dL    Total Protein 7.3 6.0 - 8.5 g/dL    Albumin 4.3 3.5 - 5.2 g/dL    ALT (SGPT)  35 1 - 41 U/L    AST (SGOT) 37 1 - 40 U/L    Alkaline Phosphatase 77 39 - 117 U/L    Total Bilirubin 0.7 0.0 - 1.2 mg/dL    Globulin 3.0 gm/dL    A/G Ratio 1.4 g/dL    BUN/Creatinine Ratio 16.0 7.0 - 25.0    Anion Gap 13.0 5.0 - 15.0 mmol/L    eGFR 114.1 >60.0 mL/min/1.73   Magnesium    Collection Time: 03/21/24 10:35 AM    Specimen: Blood   Result Value Ref Range    Magnesium 2.2 1.6 - 2.6 mg/dL   Beta Hydroxybutyrate Quantitative    Collection Time: 03/21/24 10:35 AM    Specimen: Blood   Result Value Ref Range    Beta-Hydroxybutyrate Quant 0.438 (H) 0.020 - 0.270 mmol/L   Hemoglobin A1c    Collection Time: 03/21/24 10:35 AM    Specimen: Blood   Result Value Ref Range    Hemoglobin A1C 5.90 (H) 4.80 - 5.60 %   Green Top (Gel)    Collection Time: 03/21/24 10:35 AM   Result Value Ref Range    Extra Tube Hold for add-ons.    Lavender Top    Collection Time: 03/21/24 10:35 AM   Result Value Ref Range    Extra Tube hold for add-on    Gold Top - SST    Collection Time: 03/21/24 10:35 AM   Result Value Ref Range    Extra Tube Hold for add-ons.    Light Blue Top    Collection Time: 03/21/24 10:35 AM   Result Value Ref Range    Extra Tube Hold for add-ons.    Urinalysis With Microscopic If Indicated (No Culture) - Urine, Clean Catch    Collection Time: 03/21/24 11:39 AM    Specimen: Urine, Clean Catch   Result Value Ref Range    Color, UA Yellow Yellow, Straw    Appearance, UA Clear Clear    pH, UA 6.5 5.0 - 8.0    Specific Gravity, UA <1.005 (L) 1.005 - 1.030    Glucose, UA Negative Negative    Ketones, UA Negative Negative    Bilirubin, UA Negative Negative    Blood, UA Negative Negative    Protein, UA Negative Negative    Leuk Esterase, UA Negative Negative    Nitrite, UA Negative Negative    Urobilinogen, UA 0.2 E.U./dL 0.2 - 1.0 E.U./dL           MEDICATIONS GIVEN IN ER  Medications   sodium chloride 0.9 % flush 10 mL (has no administration in time range)         ORDERS PLACED DURING THIS VISIT:  Orders Placed This  Encounter   Procedures   • Comprehensive Metabolic Panel   • Urinalysis With Microscopic If Indicated (No Culture) - Urine, Clean Catch   • Magnesium   • Beta Hydroxybutyrate Quantitative   • Hemoglobin A1c   • [place] Draw   • Insert Peripheral IV   • Green Top (Gel)   • Lavender Top   • Gold Top - SST   • Light Blue Top         OUTPATIENT MEDICATION MANAGEMENT:  Current Facility-Administered Medications Ordered in Epic   Medication Dose Route Frequency Provider Last Rate Last Admin   • sodium chloride 0.9 % flush 10 mL  10 mL Intravenous PRN Zahira Alexandra PA-C         Current Outpatient Medications Ordered in Epic   Medication Sig Dispense Refill   • olmesartan (BENICAR) 40 MG tablet Take 1/2 tab by mouth X 3 days, then increase to 1 tablet daily (Patient taking differently: Take 1 tablet by mouth Daily. Take 1/2 tab by mouth X 3 days, then increase to 1 tablet daily) 30 tablet 2   • VITAMIN D PO Take  by mouth Daily.     • omeprazole (priLOSEC) 20 MG capsule Take 1 capsule by mouth Daily. 30 capsule 1           PROGRESS, DATA ANALYSIS, CONSULTS, AND MEDICAL DECISION MAKING  All labs have been independently interpreted by me.  All radiology studies have been reviewed by me. All EKG's have been independently viewed and interpreted by me.  Discussion below represents my analysis of pertinent findings related to patient's condition, differential diagnosis, treatment plan and final disposition.    Differential diagnosis includes but is not limited to diabetes, electrolyte deficiency, vitamin deficiency.        ED Course as of 03/21/24 2009   Thu Mar 21, 2024   1116 Glucose(!): 100 [MP]   1116 BUN: 12 [MP]   1116 Creatinine(!): 0.75 [MP]   1241 Hemoglobin A1C(!): 5.90 [WH]   1339 Test results discussed with the patient and his wife.  Patient was advised to check and record his blood pressure regularly and to follow-up with his PCP.  Return precautions were discussed [WH]      ED Course User Index  [MP] Ibrahima  Zahira MCCANN PA-C  [] Johnson Manzanares MD             AS OF 14:22 EDT VITALS:    BP - 133/80  HR - 70  TEMP - 97.6 °F (36.4 °C) (Tympanic)  O2 SATS - 93%    COMPLEXITY OF CARE  Admission was considered but after careful review of the patient's presentation, physical examination, diagnostic results, and response to treatment the patient may be safely discharged with outpatient follow-up.      DIAGNOSIS  Final diagnoses:   Increased thirst   Paresthesia of both hands   Hypertension, unspecified type   Elevated hemoglobin A1c         DISPOSITION  ED Disposition       ED Disposition   Discharge    Condition   Stable    Comment   --                Please note that portions of this document were completed with a voice recognition program.    Note Disclaimer: At [facility], we believe that sharing information builds trust and better relationships. You are receiving this note because you recently visited [facility]. It is possible you will see health information before a provider has talked with you about it. This kind of information can be easy to misunderstand. To help you fully understand what it means for your health, we urge you to discuss this note with your provider.         Zahira Alexandra PA-C  03/21/24 1423       Zahira Alexandra PA-C  03/21/24 2009

## 2024-03-21 NOTE — ED PROVIDER NOTES
MD ATTESTATION NOTE     SHARED VISIT: This visit was performed by BOTH a physician and an APC. The substantive portion of the medical decision making was performed by this attesting physician who made or approved the management plan and takes responsibility for patient management. All studies in the APC note (if performed) were independently interpreted by me.  The LEODAN and I have discussed this patient's history, physical exam, and treatment plan. I have reviewed the documentation and personally had a face to face interaction with the patient. I affirm the documentation and agree with the treatment and plan. I provided a substantive portion of the care of the patient.  I personally performed the physical exam in its entirety, and below are my findings.      Brief HPI: Patient complains of increased thirst, increased urination, and tingling in his hands for the past several days.  He saw his PCP 3 days ago and was started on olmesartan for hypertension.  Denies headache, vision changes, focal weakness, dizziness, chest pain, shortness of breath, abdominal pain, vomiting, diarrhea, dysuria, fever, or history of diabetes.    PHYSICAL EXAM  ED Triage Vitals   Temp Heart Rate Resp BP SpO2   03/21/24 1008 03/21/24 1008 03/21/24 1008 03/21/24 1036 03/21/24 1008   97.6 °F (36.4 °C) 80 16 143/94 100 %      Temp src Heart Rate Source Patient Position BP Location FiO2 (%)   03/21/24 1008 03/21/24 1008 -- -- --   Tympanic Monitor            GENERAL: Awake, alert, oriented x 3.  Well-developed, well-nourished male.  Resting comfortably in no acute distress.  BMI 37.5  HENT: nares patent, moist mucous membranes  EYES: no scleral icterus  CV: regular rhythm, normal rate, equal radial and pedal pulses bilaterally  RESPIRATORY: normal effort, clear to auscultation bilaterally  ABDOMEN: soft, obese, nontender  MUSCULOSKELETAL: no deformity  NEURO: Speech is clear and fluent.  No facial droop.  Normal strength and light touch  sensation in all extremities  PSYCH:  calm, cooperative  SKIN: warm, dry    Vital signs and nursing notes reviewed.        Plan: Obtain labs    MDM: Patient presented the ED complaining of increased thirst, increased urination, and tingling in his hands.  Glucose was 100.  Hemoglobin A1c was mildly elevated.  Urinalysis was unremarkable.  Patient was not dehydrated.  He is likely prediabetic.  He was advised to follow-up with his PCP.    ED Course as of 03/21/24 1810   Thu Mar 21, 2024   1116 Glucose(!): 100 [MP]   1116 BUN: 12 [MP]   1116 Creatinine(!): 0.75 [MP]   1241 Hemoglobin A1C(!): 5.90 [WH]   1339 Test results discussed with the patient and his wife.  Patient was advised to check and record his blood pressure regularly and to follow-up with his PCP.  Return precautions were discussed [WH]      ED Course User Index  [MP] Zahira Alexandra PARobertoC  [] Johnson Manzanares MD Holland, William D, MD  03/21/24 1810

## 2024-03-21 NOTE — ED NOTES
Pt to ED with c/o frequency of urination, tingling to L arm, increased thirst, placed on new bp med recently.

## 2024-03-22 ENCOUNTER — TELEPHONE (OUTPATIENT)
Dept: FAMILY MEDICINE CLINIC | Facility: CLINIC | Age: 44
End: 2024-03-22
Payer: COMMERCIAL

## 2024-03-22 NOTE — TELEPHONE ENCOUNTER
Patient was seen in ER yesterday the doctor suggested that he get a H Pylori blood test. Please call patient to schedule when order is done. [phone]

## 2024-03-24 DIAGNOSIS — R10.13 DYSPEPSIA: Primary | ICD-10-CM

## 2024-03-29 ENCOUNTER — HOSPITAL ENCOUNTER (OUTPATIENT)
Dept: CT IMAGING | Facility: HOSPITAL | Age: 44
Discharge: HOME | End: 2024-03-29
Admitting: FAMILY MEDICINE
Payer: COMMERCIAL

## 2024-03-29 DIAGNOSIS — R91.8 LUNG NODULE, MULTIPLE: ICD-10-CM

## 2024-03-29 PROCEDURE — 71250 CT THORAX DX C-: CPT

## 2024-04-05 DIAGNOSIS — Z13.1 SCREENING FOR DIABETES MELLITUS: Primary | ICD-10-CM

## 2024-04-05 DIAGNOSIS — R10.13 DYSPEPSIA: ICD-10-CM

## 2024-04-07 LAB — UREA BREATH TEST QL: NEGATIVE

## 2024-06-12 DIAGNOSIS — I10 PRIMARY HYPERTENSION: ICD-10-CM

## 2024-06-12 RX ORDER — OLMESARTAN MEDOXOMIL 40 MG/1
TABLET ORAL
Qty: 90 TABLET | Refills: 1 | Status: SHIPPED | OUTPATIENT
Start: 2024-06-12

## 2024-09-04 ENCOUNTER — TELEPHONE (OUTPATIENT)
Dept: FAMILY MEDICINE CLINIC | Facility: CLINIC | Age: 44
End: 2024-09-04
Payer: COMMERCIAL

## 2024-09-04 NOTE — TELEPHONE ENCOUNTER
Spoke with patient, relayed message from provider to patient. Patient would like to test on Friday for Covid/Flu in our office. Advised to treat symptoms with OTC medication. Patient verbalized an understanding.

## 2024-09-04 NOTE — TELEPHONE ENCOUNTER
Caller: Norberto Jarrell    Relationship: Self    Best call back number: [phone]     What is the best time to reach you: ANY    Who are you requesting to speak with (clinical staff, provider,  specific staff member): CLINICAL        What was the call regarding: PATIENT WAS WANTING TO KNOW IF THERE IS ANYTHING YOU CAN ADVISE TO HELP WITH A REALLY BAD FLU CASE?    PATIENT HAS TAKEN A COVID TEST AND ALL OF THEM ARE NEGATIVE, BUT STATED THAT IT DOES FEEL LIKE COVID.    PLEASE CALL PATIENT TO ADVISE.

## 2024-12-20 DIAGNOSIS — I10 PRIMARY HYPERTENSION: ICD-10-CM

## 2024-12-20 RX ORDER — OLMESARTAN MEDOXOMIL 40 MG/1
TABLET ORAL
Qty: 90 TABLET | Refills: 1 | Status: SHIPPED | OUTPATIENT
Start: 2024-12-20

## 2025-06-21 DIAGNOSIS — I10 PRIMARY HYPERTENSION: ICD-10-CM

## 2025-06-23 RX ORDER — OLMESARTAN MEDOXOMIL 40 MG/1
TABLET ORAL
Qty: 90 TABLET | Refills: 1 | OUTPATIENT
Start: 2025-06-23

## 2025-07-01 DIAGNOSIS — I10 PRIMARY HYPERTENSION: ICD-10-CM

## 2025-07-01 RX ORDER — OLMESARTAN MEDOXOMIL 40 MG/1
40 TABLET ORAL DAILY
Qty: 30 TABLET | Refills: 0 | Status: SHIPPED | OUTPATIENT
Start: 2025-07-01

## 2025-07-01 NOTE — TELEPHONE ENCOUNTER
LAST REFILL - 12/20/24  LAST VISIT - 03/18/24  NEXT VISIT - 08/05/25 - patient scheduled after last refill was denied due to patient needing appointment.    Routing to covering provider per PCP being out of office. Please advise. 30 day supply pended.

## 2025-07-01 NOTE — TELEPHONE ENCOUNTER
Caller: Norberto Jarrell    Relationship: Self    Best call back number: [phone]     Requested Prescriptions:   Requested Prescriptions     Pending Prescriptions Disp Refills    olmesartan (BENICAR) 40 MG tablet 90 tablet 1     Sig: Take 1/2 tab by mouth X 3 days, then increase to 1 tablet daily        Pharmacy where request should be sent: [facility] [id number] [place] [address] - [phone]  - [phone] FX     Last office visit with prescribing clinician: 2/19/2024   Last telemedicine visit with prescribing clinician: Visit date not found   Next office visit with prescribing clinician: 8/5/2025     Does the patient have less than a 3 day supply:  [x] Yes  [] No      Soy [place] Rep   07/01/25 12:55 EDT

## 2025-07-28 DIAGNOSIS — R14.2 BELCHING: ICD-10-CM

## 2025-07-28 DIAGNOSIS — I10 PRIMARY HYPERTENSION: ICD-10-CM

## 2025-07-28 DIAGNOSIS — R07.89 ATYPICAL CHEST PAIN: ICD-10-CM

## 2025-07-28 RX ORDER — OLMESARTAN MEDOXOMIL 40 MG/1
40 TABLET ORAL DAILY
Qty: 30 TABLET | Refills: 0 | Status: SHIPPED | OUTPATIENT
Start: 2025-07-28

## 2025-07-28 NOTE — TELEPHONE ENCOUNTER
Caller: Norberto Jarrell    Relationship: Self    Best call back number: [phone]     Requested Prescriptions:   Requested Prescriptions     Pending Prescriptions Disp Refills    olmesartan (BENICAR) 40 MG tablet 30 tablet 0     Sig: Take 1 tablet by mouth Daily.        Pharmacy where request should be sent: [facility] [id number] [place] [address] - [phone] PH - [phone] FX     Last office visit with prescribing clinician: 2/19/2024   Last telemedicine visit with prescribing clinician: Visit date not found   Next office visit with prescribing clinician: 8/5/2025     Additional details provided by patient:     Does the patient have less than a 3 day supply:  [x] Yes  [] No    Would you like a call back once the refill request has been completed: [] Yes [x] No    If the office needs to give you a call back, can they leave a voicemail: [] Yes [x] No    Soy James Rep   07/28/25 13:17 EDT

## 2025-08-05 ENCOUNTER — OFFICE VISIT (OUTPATIENT)
Dept: FAMILY MEDICINE CLINIC | Facility: CLINIC | Age: 45
End: 2025-08-05
Payer: COMMERCIAL

## 2025-08-05 VITALS
HEART RATE: 83 BPM | OXYGEN SATURATION: 96 % | SYSTOLIC BLOOD PRESSURE: 158 MMHG | HEIGHT: 76 IN | RESPIRATION RATE: 16 BRPM | BODY MASS INDEX: 37.81 KG/M2 | DIASTOLIC BLOOD PRESSURE: 108 MMHG | WEIGHT: 310.5 LBS

## 2025-08-05 DIAGNOSIS — Z13.1 SCREENING FOR DIABETES MELLITUS: ICD-10-CM

## 2025-08-05 DIAGNOSIS — Z13.220 ENCOUNTER FOR LIPID SCREENING FOR CARDIOVASCULAR DISEASE: ICD-10-CM

## 2025-08-05 DIAGNOSIS — E66.812 CLASS 2 OBESITY DUE TO EXCESS CALORIES WITHOUT SERIOUS COMORBIDITY WITH BODY MASS INDEX (BMI) OF 37.0 TO 37.9 IN ADULT: ICD-10-CM

## 2025-08-05 DIAGNOSIS — Z12.11 SCREENING FOR COLON CANCER: ICD-10-CM

## 2025-08-05 DIAGNOSIS — Z00.00 ROUTINE HEALTH MAINTENANCE: Primary | ICD-10-CM

## 2025-08-05 DIAGNOSIS — E66.09 CLASS 2 OBESITY DUE TO EXCESS CALORIES WITHOUT SERIOUS COMORBIDITY WITH BODY MASS INDEX (BMI) OF 37.0 TO 37.9 IN ADULT: ICD-10-CM

## 2025-08-05 DIAGNOSIS — Z13.6 ENCOUNTER FOR LIPID SCREENING FOR CARDIOVASCULAR DISEASE: ICD-10-CM

## 2025-08-05 DIAGNOSIS — I10 PRIMARY HYPERTENSION: ICD-10-CM

## 2025-08-05 PROCEDURE — 99396 PREV VISIT EST AGE 40-64: CPT | Performed by: FAMILY MEDICINE

## 2025-08-05 PROCEDURE — 99213 OFFICE O/P EST LOW 20 MIN: CPT | Performed by: FAMILY MEDICINE

## 2025-08-05 RX ORDER — OLMESARTAN MEDOXOMIL AND HYDROCHLOROTHIAZIDE 40/25 40; 25 MG/1; MG/1
1 TABLET ORAL DAILY
Qty: 30 TABLET | Refills: 1 | Status: SHIPPED | OUTPATIENT
Start: 2025-08-05

## 2025-08-06 LAB
ALBUMIN SERPL-MCNC: 4.4 G/DL (ref 3.5–5.2)
ALBUMIN/GLOB SERPL: 1.6 G/DL
ALP SERPL-CCNC: 74 U/L (ref 39–117)
ALT SERPL-CCNC: 36 U/L (ref 1–41)
AST SERPL-CCNC: 27 U/L (ref 1–40)
BILIRUB SERPL-MCNC: 0.4 MG/DL (ref 0–1.2)
BUN SERPL-MCNC: 17 MG/DL (ref 6–20)
BUN/CREAT SERPL: 20.7 (ref 7–25)
CALCIUM SERPL-MCNC: 9.3 MG/DL (ref 8.6–10.5)
CHLORIDE SERPL-SCNC: 102 MMOL/L (ref 98–107)
CHOLEST SERPL-MCNC: 182 MG/DL (ref 0–200)
CO2 SERPL-SCNC: 25.9 MMOL/L (ref 22–29)
CREAT SERPL-MCNC: 0.82 MG/DL (ref 0.76–1.27)
EGFRCR SERPLBLD CKD-EPI 2021: 110.4 ML/MIN/1.73
GLOBULIN SER CALC-MCNC: 2.7 GM/DL
GLUCOSE SERPL-MCNC: 118 MG/DL (ref 65–99)
HBA1C MFR BLD: 5.9 % (ref 4.8–5.6)
HDLC SERPL-MCNC: 56 MG/DL (ref 40–60)
LDLC SERPL CALC-MCNC: 107 MG/DL (ref 0–100)
POTASSIUM SERPL-SCNC: 4.5 MMOL/L (ref 3.5–5.2)
PROT SERPL-MCNC: 7.1 G/DL (ref 6–8.5)
SODIUM SERPL-SCNC: 137 MMOL/L (ref 136–145)
TRIGL SERPL-MCNC: 104 MG/DL (ref 0–150)
VLDLC SERPL CALC-MCNC: 19 MG/DL (ref 5–40)

## (undated) DEVICE — TUBING, SUCTION, 1/4" X 10', STRAIGHT: Brand: MEDLINE

## (undated) DEVICE — LN SMPL CO2 SHTRM SD STREAM W/M LUER

## (undated) DEVICE — SENSR O2 OXIMAX FNGR A/ 18IN NONSTR

## (undated) DEVICE — KT ORCA ORCAPOD DISP STRL

## (undated) DEVICE — FRCP BX RADJAW4 NDL 2.8 240CM LG OG BX40

## (undated) DEVICE — BITEBLOCK OMNI BLOC

## (undated) DEVICE — ADAPT CLN BIOGUARD AIR/H2O DISP

## (undated) DEVICE — MSK ENDO PORT O2 POM ELITE CURAPLEX A/